# Patient Record
Sex: FEMALE | Race: WHITE | NOT HISPANIC OR LATINO | Employment: FULL TIME | URBAN - METROPOLITAN AREA
[De-identification: names, ages, dates, MRNs, and addresses within clinical notes are randomized per-mention and may not be internally consistent; named-entity substitution may affect disease eponyms.]

---

## 2017-03-13 ENCOUNTER — OFFICE VISIT - HEALTHEAST (OUTPATIENT)
Dept: FAMILY MEDICINE | Facility: CLINIC | Age: 47
End: 2017-03-13

## 2017-03-13 DIAGNOSIS — D48.5 NEOPLASM OF UNCERTAIN BEHAVIOR OF SKIN: ICD-10-CM

## 2017-03-13 DIAGNOSIS — E66.3 OVERWEIGHT: ICD-10-CM

## 2017-03-13 DIAGNOSIS — Z00.00 ROUTINE GENERAL MEDICAL EXAMINATION AT A HEALTH CARE FACILITY: ICD-10-CM

## 2017-03-13 LAB
CHOLEST SERPL-MCNC: 227 MG/DL
FASTING STATUS PATIENT QL REPORTED: YES
HDLC SERPL-MCNC: 79 MG/DL
LDLC SERPL CALC-MCNC: 136 MG/DL
TRIGL SERPL-MCNC: 58 MG/DL

## 2017-03-13 ASSESSMENT — MIFFLIN-ST. JEOR: SCORE: 1384.3

## 2017-03-15 ENCOUNTER — HOSPITAL ENCOUNTER (OUTPATIENT)
Dept: MAMMOGRAPHY | Facility: HOSPITAL | Age: 47
Discharge: HOME | End: 2017-03-15
Attending: NURSE PRACTITIONER

## 2017-03-15 DIAGNOSIS — Z00.00 ROUTINE GENERAL MEDICAL EXAMINATION AT A HEALTH CARE FACILITY: ICD-10-CM

## 2017-03-24 ENCOUNTER — RECORDS - HEALTHEAST (OUTPATIENT)
Dept: ADMINISTRATIVE | Facility: OTHER | Age: 47
End: 2017-03-24

## 2017-05-22 ENCOUNTER — COMMUNICATION - HEALTHEAST (OUTPATIENT)
Dept: FAMILY MEDICINE | Facility: CLINIC | Age: 47
End: 2017-05-22

## 2017-05-22 DIAGNOSIS — M79.10 MUSCLE PAIN: ICD-10-CM

## 2017-05-22 RX ORDER — IBUPROFEN 800 MG/1
TABLET, FILM COATED ORAL
Qty: 30 TABLET | Refills: 5 | Status: SHIPPED | OUTPATIENT
Start: 2017-05-22 | End: 2022-10-05

## 2017-07-01 ENCOUNTER — COMMUNICATION - HEALTHEAST (OUTPATIENT)
Dept: FAMILY MEDICINE | Facility: CLINIC | Age: 47
End: 2017-07-01

## 2017-10-30 ENCOUNTER — OFFICE VISIT - HEALTHEAST (OUTPATIENT)
Dept: FAMILY MEDICINE | Facility: CLINIC | Age: 47
End: 2017-10-30

## 2017-10-30 ENCOUNTER — COMMUNICATION - HEALTHEAST (OUTPATIENT)
Dept: FAMILY MEDICINE | Facility: CLINIC | Age: 47
End: 2017-10-30

## 2017-10-30 DIAGNOSIS — J02.9 ACUTE PHARYNGITIS: ICD-10-CM

## 2017-10-31 ENCOUNTER — COMMUNICATION - HEALTHEAST (OUTPATIENT)
Dept: FAMILY MEDICINE | Facility: CLINIC | Age: 47
End: 2017-10-31

## 2017-10-31 ENCOUNTER — AMBULATORY - HEALTHEAST (OUTPATIENT)
Dept: FAMILY MEDICINE | Facility: CLINIC | Age: 47
End: 2017-10-31

## 2017-11-01 ENCOUNTER — OFFICE VISIT - HEALTHEAST (OUTPATIENT)
Dept: FAMILY MEDICINE | Facility: CLINIC | Age: 47
End: 2017-11-01

## 2017-11-01 DIAGNOSIS — J06.9 URI (UPPER RESPIRATORY INFECTION): ICD-10-CM

## 2017-11-01 ASSESSMENT — MIFFLIN-ST. JEOR
SCORE: 1338.26
SCORE: 1338.26

## 2018-01-10 ENCOUNTER — COMMUNICATION - HEALTHEAST (OUTPATIENT)
Dept: FAMILY MEDICINE | Facility: CLINIC | Age: 48
End: 2018-01-10

## 2018-07-11 ENCOUNTER — COMMUNICATION - HEALTHEAST (OUTPATIENT)
Dept: FAMILY MEDICINE | Facility: CLINIC | Age: 48
End: 2018-07-11

## 2019-01-20 ENCOUNTER — COMMUNICATION - HEALTHEAST (OUTPATIENT)
Dept: FAMILY MEDICINE | Facility: CLINIC | Age: 49
End: 2019-01-20

## 2019-01-21 ENCOUNTER — COMMUNICATION - HEALTHEAST (OUTPATIENT)
Dept: FAMILY MEDICINE | Facility: CLINIC | Age: 49
End: 2019-01-21

## 2019-03-23 ENCOUNTER — COMMUNICATION - HEALTHEAST (OUTPATIENT)
Dept: FAMILY MEDICINE | Facility: CLINIC | Age: 49
End: 2019-03-23

## 2019-03-23 DIAGNOSIS — M62.838 MUSCLE SPASM: ICD-10-CM

## 2019-04-25 ENCOUNTER — COMMUNICATION - HEALTHEAST (OUTPATIENT)
Dept: FAMILY MEDICINE | Facility: CLINIC | Age: 49
End: 2019-04-25

## 2019-04-25 DIAGNOSIS — M62.838 MUSCLE SPASM: ICD-10-CM

## 2019-05-26 ENCOUNTER — COMMUNICATION - HEALTHEAST (OUTPATIENT)
Dept: FAMILY MEDICINE | Facility: CLINIC | Age: 49
End: 2019-05-26

## 2019-05-26 DIAGNOSIS — M62.838 MUSCLE SPASM: ICD-10-CM

## 2019-06-22 ENCOUNTER — COMMUNICATION - HEALTHEAST (OUTPATIENT)
Dept: FAMILY MEDICINE | Facility: CLINIC | Age: 49
End: 2019-06-22

## 2019-06-22 DIAGNOSIS — M62.838 MUSCLE SPASM: ICD-10-CM

## 2019-07-26 ENCOUNTER — COMMUNICATION - HEALTHEAST (OUTPATIENT)
Dept: FAMILY MEDICINE | Facility: CLINIC | Age: 49
End: 2019-07-26

## 2019-07-26 DIAGNOSIS — M62.838 MUSCLE SPASM: ICD-10-CM

## 2019-08-14 ENCOUNTER — OFFICE VISIT - HEALTHEAST (OUTPATIENT)
Dept: FAMILY MEDICINE | Facility: CLINIC | Age: 49
End: 2019-08-14

## 2019-08-14 ENCOUNTER — AMBULATORY - HEALTHEAST (OUTPATIENT)
Dept: OTHER | Facility: CLINIC | Age: 49
End: 2019-08-14

## 2019-08-14 ENCOUNTER — DOCUMENTATION ONLY (OUTPATIENT)
Dept: OTHER | Facility: CLINIC | Age: 49
End: 2019-08-14

## 2019-08-14 DIAGNOSIS — Z00.00 PHYSICAL EXAM: ICD-10-CM

## 2019-08-14 DIAGNOSIS — Z11.4 SCREENING FOR HIV (HUMAN IMMUNODEFICIENCY VIRUS): ICD-10-CM

## 2019-08-14 DIAGNOSIS — M62.838 MUSCLE SPASM: ICD-10-CM

## 2019-08-14 DIAGNOSIS — E66.3 OVERWEIGHT (BMI 25.0-29.9): ICD-10-CM

## 2019-08-14 DIAGNOSIS — R03.0 ELEVATED BLOOD-PRESSURE READING WITHOUT DIAGNOSIS OF HYPERTENSION: ICD-10-CM

## 2019-08-14 DIAGNOSIS — Z12.31 VISIT FOR SCREENING MAMMOGRAM: ICD-10-CM

## 2019-08-14 DIAGNOSIS — I83.92 ASYMPTOMATIC VARICOSE VEINS OF LEFT LOWER EXTREMITY: ICD-10-CM

## 2019-08-14 LAB
CHOLEST SERPL-MCNC: 225 MG/DL
FASTING STATUS PATIENT QL REPORTED: NO
FASTING STATUS PATIENT QL REPORTED: NO
GLUCOSE BLD-MCNC: 84 MG/DL (ref 70–125)
HDLC SERPL-MCNC: 75 MG/DL
HGB BLD-MCNC: 13.8 G/DL (ref 12–16)
HIV 1+2 AB+HIV1 P24 AG SERPL QL IA: NEGATIVE
LDLC SERPL CALC-MCNC: 119 MG/DL
TRIGL SERPL-MCNC: 156 MG/DL
TSH SERPL DL<=0.005 MIU/L-ACNC: 0.95 UIU/ML (ref 0.3–5)

## 2019-08-14 ASSESSMENT — MIFFLIN-ST. JEOR: SCORE: 1394.62

## 2019-08-27 ENCOUNTER — AMBULATORY - HEALTHEAST (OUTPATIENT)
Dept: NURSING | Facility: CLINIC | Age: 49
End: 2019-08-27

## 2019-08-27 DIAGNOSIS — R03.0 ELEVATED BLOOD PRESSURE READING WITHOUT DIAGNOSIS OF HYPERTENSION: ICD-10-CM

## 2019-08-27 DIAGNOSIS — I10 ESSENTIAL HYPERTENSION: ICD-10-CM

## 2019-09-24 ENCOUNTER — HOSPITAL ENCOUNTER (OUTPATIENT)
Dept: MAMMOGRAPHY | Facility: CLINIC | Age: 49
Discharge: HOME | End: 2019-09-24
Attending: FAMILY MEDICINE

## 2019-09-24 DIAGNOSIS — Z12.31 VISIT FOR SCREENING MAMMOGRAM: ICD-10-CM

## 2019-10-11 ENCOUNTER — COMMUNICATION - HEALTHEAST (OUTPATIENT)
Dept: FAMILY MEDICINE | Facility: CLINIC | Age: 49
End: 2019-10-11

## 2019-10-11 DIAGNOSIS — I10 BENIGN ESSENTIAL HYPERTENSION: ICD-10-CM

## 2020-06-11 ENCOUNTER — COMMUNICATION - HEALTHEAST (OUTPATIENT)
Dept: HEALTH INFORMATION MANAGEMENT | Facility: CLINIC | Age: 50
End: 2020-06-11

## 2020-08-02 ENCOUNTER — COMMUNICATION - HEALTHEAST (OUTPATIENT)
Dept: FAMILY MEDICINE | Facility: CLINIC | Age: 50
End: 2020-08-02

## 2020-08-02 DIAGNOSIS — M62.838 MUSCLE SPASM: ICD-10-CM

## 2020-09-28 ENCOUNTER — COMMUNICATION - HEALTHEAST (OUTPATIENT)
Dept: FAMILY MEDICINE | Facility: CLINIC | Age: 50
End: 2020-09-28

## 2020-09-28 DIAGNOSIS — I10 BENIGN ESSENTIAL HYPERTENSION: ICD-10-CM

## 2020-10-01 ENCOUNTER — COMMUNICATION - HEALTHEAST (OUTPATIENT)
Dept: FAMILY MEDICINE | Facility: CLINIC | Age: 50
End: 2020-10-01

## 2020-10-27 ENCOUNTER — COMMUNICATION - HEALTHEAST (OUTPATIENT)
Dept: FAMILY MEDICINE | Facility: CLINIC | Age: 50
End: 2020-10-27

## 2020-10-27 DIAGNOSIS — M62.838 MUSCLE SPASM: ICD-10-CM

## 2020-10-27 DIAGNOSIS — I10 BENIGN ESSENTIAL HYPERTENSION: ICD-10-CM

## 2020-12-28 ENCOUNTER — OFFICE VISIT - HEALTHEAST (OUTPATIENT)
Dept: FAMILY MEDICINE | Facility: CLINIC | Age: 50
End: 2020-12-28

## 2020-12-28 DIAGNOSIS — Z12.31 VISIT FOR SCREENING MAMMOGRAM: ICD-10-CM

## 2020-12-28 DIAGNOSIS — M62.838 MUSCLE SPASM: ICD-10-CM

## 2020-12-28 DIAGNOSIS — I10 BENIGN ESSENTIAL HYPERTENSION: ICD-10-CM

## 2020-12-28 LAB
ANION GAP SERPL CALCULATED.3IONS-SCNC: 12 MMOL/L (ref 5–18)
BUN SERPL-MCNC: 15 MG/DL (ref 8–22)
CALCIUM SERPL-MCNC: 9.4 MG/DL (ref 8.5–10.5)
CHLORIDE BLD-SCNC: 107 MMOL/L (ref 98–107)
CO2 SERPL-SCNC: 21 MMOL/L (ref 22–31)
CREAT SERPL-MCNC: 0.75 MG/DL (ref 0.6–1.1)
GFR SERPL CREATININE-BSD FRML MDRD: >60 ML/MIN/1.73M2
GLUCOSE BLD-MCNC: 85 MG/DL (ref 70–125)
POTASSIUM BLD-SCNC: 4.7 MMOL/L (ref 3.5–5)
SODIUM SERPL-SCNC: 140 MMOL/L (ref 136–145)

## 2020-12-28 RX ORDER — LOSARTAN POTASSIUM 25 MG/1
25 TABLET ORAL DAILY
Qty: 90 TABLET | Refills: 3 | Status: SHIPPED | OUTPATIENT
Start: 2020-12-28 | End: 2022-03-22

## 2020-12-28 RX ORDER — CYCLOBENZAPRINE HCL 10 MG
TABLET ORAL
Qty: 90 TABLET | Refills: 3 | Status: SHIPPED | OUTPATIENT
Start: 2020-12-28 | End: 2022-01-26

## 2020-12-28 ASSESSMENT — MIFFLIN-ST. JEOR: SCORE: 1398.59

## 2021-03-31 ENCOUNTER — COMMUNICATION - HEALTHEAST (OUTPATIENT)
Dept: FAMILY MEDICINE | Facility: CLINIC | Age: 51
End: 2021-03-31

## 2021-05-26 NOTE — TELEPHONE ENCOUNTER
RN cannot approve Refill Request    RN can NOT refill this medication med is not covered by policy/route to provider.     Last office visit: Visit date not found Last Physical: 4/23/2015 Last MTM visit: Visit date not found Last visit same specialty: 11/1/2017 Stephy Kurtz DO.  Next visit within 3 mo: Visit date not found  Next physical within 3 mo: Visit date not found      Nitin Keys, [facility] Triage/Med Refill 3/23/2019    Requested Prescriptions   Pending Prescriptions Disp Refills     cyclobenzaprine (FLEXERIL) 10 MG tablet [Pharmacy Med Name: CYCLOBENZAPRINE HCL 10MG TABS] 30 tablet 0     Sig: TAKE ONE TABLET BY MOUTH THREE TIMES A DAY AS NEEDED FOR MUSCLE SPASMS    There is no refill protocol information for this order

## 2021-05-28 ENCOUNTER — RECORDS - HEALTHEAST (OUTPATIENT)
Dept: ADMINISTRATIVE | Facility: CLINIC | Age: 51
End: 2021-05-28

## 2021-05-28 NOTE — TELEPHONE ENCOUNTER
RN cannot approve Refill Request    RN can NOT refill this medication med is not covered by policy/route to provider. Last office visit: Visit date not found Last Physical: 4/23/2015 Last MTM visit: Visit date not found Last visit same specialty: 11/1/2017 Stephy Kurtz DO.  Next visit within 3 mo: Visit date not found  Next physical within 3 mo: Visit date not found      Teressa Patel, [facility] Triage/Med Refill 4/25/2019    Requested Prescriptions   Pending Prescriptions Disp Refills     cyclobenzaprine (FLEXERIL) 10 MG tablet [Pharmacy Med Name: CYCLOBENZAPRINE HCL 10MG TABS] 30 tablet 0     Sig: TAKE ONE TABLET BY MOUTH THREE TIMES A DAY AS NEEDED FOR MUSCLE SPASMS       There is no refill protocol information for this order

## 2021-05-29 NOTE — TELEPHONE ENCOUNTER
RN cannot approve Refill Request    RN can NOT refill this medication med is not covered by policy/route to provider. Last office visit: Visit date not found Last Physical: Visit date not found Last MTM visit: Visit date not found Last visit same specialty: 11/1/2017 Stephy Kurtz DO.  Next visit within 3 mo: Visit date not found  Next physical within 3 mo: Visit date not found      Ary Rousseau, [facility] Triage/Med Refill 6/22/2019    Requested Prescriptions   Pending Prescriptions Disp Refills     cyclobenzaprine (FLEXERIL) 10 MG tablet [Pharmacy Med Name: CYCLOBENZAPRINE HCL 10MG TABS] 30 tablet 0     Sig: TAKE ONE TABLET BY MOUTH THREE TIMES A DAY AS NEEDED FOR MUSCLE SPASMS       There is no refill protocol information for this order

## 2021-05-29 NOTE — TELEPHONE ENCOUNTER
RN cannot approve Refill Request    RN can NOT refill this medication med is not covered by policy/route to provider. Last office visit: Visit date not found Last Physical: 4/23/2015 Last MTM visit: Visit date not found Last visit same specialty: 11/1/2017 Stephy Kurtz DO.  Next visit within 3 mo: Visit date not found  Next physical within 3 mo: Visit date not found      Guillermina Marcial, [facility] Triage/Med Refill 5/26/2019    Requested Prescriptions   Pending Prescriptions Disp Refills     cyclobenzaprine (FLEXERIL) 10 MG tablet [Pharmacy Med Name: CYCLOBENZAPRINE HCL 10MG TABS] 30 tablet 0     Sig: TAKE ONE TABLET BY MOUTH THREE TIMES A DAY AS NEEDED FOR MUSCLE SPASMS       There is no refill protocol information for this order

## 2021-05-30 ENCOUNTER — HEALTH MAINTENANCE LETTER (OUTPATIENT)
Age: 51
End: 2021-05-30

## 2021-05-30 VITALS — WEIGHT: 166.1 LBS | HEIGHT: 65 IN | BODY MASS INDEX: 27.67 KG/M2

## 2021-05-30 NOTE — TELEPHONE ENCOUNTER
Refill sent.  This is patient's last refill as she has not been seen since 2017.  Please schedule her for physical and med check.

## 2021-05-30 NOTE — TELEPHONE ENCOUNTER
Left message to call back for: pt  Information to relay to patient:  Left message to call and schedule physical and med ck with dr figueroa.

## 2021-05-30 NOTE — TELEPHONE ENCOUNTER
RN cannot approve Refill Request    RN can NOT refill this medication med is not covered by policy/route to provider. Last office visit: Visit date not found Last Physical: Visit date not found Last MTM visit: Visit date not found Last visit same specialty: 11/1/2017 Stephy Kurtz DO.  Next visit within 3 mo: Visit date not found  Next physical within 3 mo: Visit date not found      Jo Jacques, [facility] Triage/Med Refill 7/26/2019    Requested Prescriptions   Pending Prescriptions Disp Refills     cyclobenzaprine (FLEXERIL) 10 MG tablet [Pharmacy Med Name: CYCLOBENZAPRINE HCL 10MG TABS] 30 tablet 0     Sig: TAKE ONE TABLET BY MOUTH THREE TIMES A DAY AS NEEDED FOR MUSCLE SPASMS       There is no refill protocol information for this order

## 2021-05-31 VITALS — WEIGHT: 158.31 LBS | BODY MASS INDEX: 26.34 KG/M2

## 2021-05-31 VITALS — BODY MASS INDEX: 24.78 KG/M2 | WEIGHT: 154.2 LBS | HEIGHT: 66 IN

## 2021-05-31 NOTE — PROGRESS NOTES
Pt here for blood pressure check with nurse. Elevated to 140/100 x2 - per review has been elevated in the past (8/14/19 and 11/1/17). Would recommend starting antihypertensive medication at this time with lisinopril 5mg daily and f/u in 2-4 weeks for recheck blood pressure with BMP.    Dr Stuart

## 2021-05-31 NOTE — PROGRESS NOTES
Assessment:        1. Physical exam  Hemoglobin    Glucose    Lipid Cascade   2. Visit for screening mammogram  Mammo Screening Bilateral   3. Screening for HIV (human immunodeficiency virus)  HIV Antigen/Antibody Screening Cascade   4. Muscle spasm  cyclobenzaprine (FLEXERIL) 10 MG tablet   5. Overweight (BMI 25.0-29.9)  Thyroid Stimulating Hormone (TSH)   6. Asymptomatic varicose veins of left lower extremity  Ambulatory referral to Vascular Surgery   7. Elevated blood-pressure reading without diagnosis of hypertension         Plan:      1. Healthcare maintenance: Pap smears are done with her gynecologist.  Last Pap was in 2015.  She is due for mammogram and orders placed.  Discussed she will be due for tetanus booster next year.  Check lipids, glucose and hemoglobin.  HIV screen also ordered.  Encouraged regular exercise and healthy diet.  Advised no more than 1 alcoholic beverage per day.  Encourage weight loss efforts.  Follow-up in 1 year for yearly physical  2. Muscle spasms: She will continue cyclobenzaprine at night as needed.  Refill provided  3. Overweight: Encouraged regular exercise, healthy diet and weight loss.  Advised reduction of alcohol intake.  Check TSH today.  Check lipids and glucose  4. Varicose vein of left lower extremity: Referral placed to vascular center  5. Elevated blood pressure reading: Recommend lifestyle changes including reduction of alcohol and salt in diet as well as weight loss.  Follow-up for nurse visit in 2 weeks for blood pressure check.          Subjective:      Haley Fonseca is a 49 y.o. female who presents for an annual exam.  We reviewed her health history.  She has not been seen by myself in several years.  She remains in good health overall.  She has trouble with muscle spasms and twitching that interferes with her sleep.  She typically takes cyclobenzaprine and ibuprofen at night and this combination of medication works well to allow her muscles to relax and  help her to fall asleep and get a good night sleep.  She does not experience any drowsiness in the the morning.  We reviewed and updated family history, medications, allergies and social history.  Unfortunately, she reports that her  passed away about a year and a half ago related to complications of alcoholism.  She drinks about 14 alcoholic beverages per day.  She reports that she exercises regularly.  She admits that she does not always eat the healthiest diet.  She is surprised to see that she has gained over 10 pounds since her last office visit.  She does not have headaches but about once a month she will notice a little bit of dizziness if she looks off to the side in certain directions.  She admits that she has been under increased stress since her  passed away.  She becomes a little tearful in discussing this but states that overall she feels that she is coping well.  She has a prominent varicose vein over her left kneecap.  This does not cause her discomfort but she is concerned that she will bump her leg into something and the vein will start to bleed or she will develop other problems.  She is interested in seeing a vein specialist to talk about a procedure to treat this even though she is aware it would likely be cosmetic and may not be covered by insurance.  Review of systems is otherwise negative.  She has no other concerns or questions today.  Healthcare maintenance: She gets Pap smears done through her gynecologist.    Healthy Habits:   Regular Exercise: Yes  Sunscreen Use: Yes  Healthy Diet: Yes  Dental Visits Regularly: Yes  Seat Belt: Yes  Sexually active: No  Self Breast Exam Monthly:Yes  Hemoccults: N/A  Flex Sig: N/A  Colonoscopy: N/A  Lipid Profile: Yes  Glucose Screen: Yes  Prevention of Osteoporosis: Yes  Last Dexa: N/A        Immunization History   Administered Date(s) Administered     Influenza, Live, Nasal LAIV3 09/25/2012     Influenza, inj, historic,unspecified  10/27/2009, 2011, 10/11/2017     Influenza, nasal, historic 2012     Influenza, seasonal,quad inj 36+ mos 10/21/2015, 2016     Influenza, seasonal,quad inj 6-35 mos 2010, 10/04/2014     Influenza,live, Nasal Laiv4 2013     Novel Influenza [id number], Nasal 11/10/2009     Tdap 2010     Immunization status: up to date and documented.      Gynecologic History  Patient's last menstrual period was 2019 (lmp unknown).  Contraception: none  Last Pap: . Results were: normal  Last mammogram: . Results were: normal      OB History    Para Term  AB Living   2 2 2         SAB TAB Ectopic Multiple Live Births                  # Outcome Date GA Lbr Justino/2nd Weight Sex Delivery Anes PTL Lv   2 Term            1 Term                Current Outpatient Medications   Medication Sig Dispense Refill     cyclobenzaprine (FLEXERIL) 10 MG tablet TAKE ONE TABLET BY MOUTH at night as needed for muscle spasms 90 tablet 3     ibuprofen (ADVIL,MOTRIN) 800 MG tablet TAKE ONE TABLET BY MOUTH AT BEDTIME 30 tablet 5     MULTIVITAMIN ORAL Take by mouth daily.       No current facility-administered medications for this visit.      No past medical history on file.  No past surgical history on file.  Patient has no known allergies.  Family History   Problem Relation Age of Onset     Hypertension Mother      Hypertension Father      Heart disease Father      Hypertension Maternal Grandmother      Hypertension Paternal Grandmother      Social History     Socioeconomic History     Marital status:      Spouse name: Not on file     Number of children: Not on file     Years of education: Not on file     Highest education level: Not on file   Occupational History     Not on file   Social Needs     Financial resource strain: Not on file     Food insecurity:     Worry: Not on file     Inability: Not on file     Transportation needs:     Medical: Not on file     Non-medical: Not on file   Tobacco  "Use     Smoking status: Former Smoker     Smokeless tobacco: Never Used   Substance and Sexual Activity     Alcohol use: Yes     Alcohol/week: 7.7 oz     Types: 7 Cans of beer, 7 Standard drinks or equivalent per week     Comment: 14 drink total per week     Drug use: No     Sexual activity: Yes     Partners: Male     Comment:    Lifestyle     Physical activity:     Days per week: Not on file     Minutes per session: Not on file     Stress: Not on file   Relationships     Social connections:     Talks on phone: Not on file     Gets together: Not on file     Attends Restorationist service: Not on file     Active member of club or organization: Not on file     Attends meetings of clubs or organizations: Not on file     Relationship status: Not on file     Intimate partner violence:     Fear of current or ex partner: Not on file     Emotionally abused: Not on file     Physically abused: Not on file     Forced sexual activity: Not on file   Other Topics Concern     Not on file   Social History Narrative     Not on file       Review of Systems      See HPI    Objective:         BP (!) 148/98 (Patient Site: Left Arm, Patient Position: Sitting, Cuff Size: Adult Large)   Pulse 93   Temp 99.1  F (37.3  C) (Oral)   Ht 5' 4.5\" (1.638 m)   Wt 170 lb 2 oz (77.2 kg)   LMP 07/22/2019 (LMP Unknown)   SpO2 96%   Breastfeeding? No   BMI 28.75 kg/m        Physical Exam:  General Appearance: Alert, cooperative, no distress, appears stated age  Head: Normocephalic, without obvious abnormality, atraumatic  Eyes: PERRL, conjunctiva/corneas clear, EOM's intact  Ears: Normal TM's and external ear canals, both ears  Nose: Nares normal, septum midline,mucosa normal, no drainage  Throat: Lips, mucosa, and tongue normal; teeth and gums normal  Neck: Supple, symmetrical, trachea midline, no adenopathy;  thyroid: not enlarged, symmetric, no tenderness/mass/nodules  Back: Symmetric, no curvature, ROM normal  Lungs: Clear to auscultation " bilaterally, respirations unlabored  Breasts: No breast masses, tenderness, asymmetry, or nipple discharge.  Heart: Regular rate and rhythm, S1 and S2 normal, no murmur, rub, or gallop, Abdomen: Soft, non-tender, bowel sounds active all four quadrants,  no masses, no organomegaly  Pelvic:Not examined  Extremities: Extremities normal, atraumatic, no cyanosis or edema, varicose vein over left patella  Skin: Skin color, texture, turgor normal, no rashes or lesions  Lymph nodes: Cervical, supraclavicular, and axillary nodes normal  Neurologic: Normal

## 2021-06-03 VITALS — WEIGHT: 170.13 LBS | HEIGHT: 65 IN | BODY MASS INDEX: 28.35 KG/M2

## 2021-06-05 VITALS
HEART RATE: 82 BPM | WEIGHT: 168.7 LBS | OXYGEN SATURATION: 96 % | SYSTOLIC BLOOD PRESSURE: 122 MMHG | BODY MASS INDEX: 28.11 KG/M2 | HEIGHT: 65 IN | DIASTOLIC BLOOD PRESSURE: 86 MMHG

## 2021-06-09 NOTE — PROGRESS NOTES
Subjective:     Haley is a 46 y.o. female presenting to the clinic for a female physical.     LMP: one week ago, regular   Hx of abnormal pap smear: none   Last pap smear: unsure   Perform self-breast exams: occasional  Vaginal discharge or irritation: none   Sexually active: yes,    Contraception: IUD   Concerns for STDs: none   Previous pregnancies:three pregnancy (2 live births, one miscarriage)     Patient has a skin lesion present on her left hand.  It is dime sized and has increased in size over the past year.  She denies any irritation or bleeding.  Her father has a history of skin cancer.    Patient had a positive depression screening.  She states that her  is seeking treatment right now for alcohol abuse.  Patient denies concerns for suicide or depression.  She does not want to receive treatment.  Admits she is under stress.    Review of systems:  I performed a 10 point review of systems.  All pertinent positives and negatives are noted in the HPI. All others are negative.     No Known Allergies    Current Outpatient Prescriptions on File Prior to Visit   Medication Sig Dispense Refill     cyclobenzaprine (FLEXERIL) 10 MG tablet TAKE ONE TABLET BY MOUTH THREE TIMES DAILY AS NEEDED FOR MUSCLE SPASM 30 tablet 5     ibuprofen (ADVIL,MOTRIN) 800 MG tablet TAKE ONE TABLET BY MOUTH AT BEDTIME 30 tablet 5     MULTIVITAMIN ORAL Take by mouth daily.       [DISCONTINUED] albuterol (PROVENTIL HFA;VENTOLIN HFA) 90 mcg/actuation inhaler Inhale 2 puffs every 4 (four) hours as needed for shortness of breath (chest tightness or cough). 1 Inhaler 3     [DISCONTINUED] azithromycin (ZITHROMAX) 250 MG tablet Take 2 tabs on day 1 and then 1 tab daily for next 4 days 6 tablet 0     [DISCONTINUED] fluticasone (FLONASE) 50 mcg/actuation nasal spray 2 sprays into each nostril daily.       No current facility-administered medications on file prior to visit.        Social History     Social History     Marital  status:      Spouse name: N/A     Number of children: N/A     Years of education: N/A     Occupational History     Not on file.     Social History Main Topics     Smoking status: Former Smoker     Smokeless tobacco: Not on file     Alcohol use 7.7 oz/week     7 Cans of beer, 7 Standard drinks or equivalent per week      Comment: 14 drink total per week     Drug use: No     Sexual activity: Yes     Partners: Male      Comment:      Other Topics Concern     Not on file     Social History Narrative       History reviewed. No pertinent past medical history.    Family History   Problem Relation Age of Onset     Hypertension Mother      Hypertension Father      Hypertension Maternal Grandmother      Hypertension Paternal Grandmother        History reviewed. No pertinent surgical history.    Objective:     Vitals:    03/13/17 0830   BP: 128/72   Pulse:    SpO2:        Patient is alert, no obvious distress.   Skin: Warm, dry.  She has a dime size flat brown skin lesion present on her left hand.  It appears dry.  HEENT:  Eyes normal.  Ears normal.  Nose patent, mucosa pink.  Oropharynx mucosa pink, no lesions or tonsil enlargement.   Neck:  Supple, without lymphadenopathy, bruits, JVD. Thyroid normal texture and size.    Lungs:  Clear to auscultation.  No wheezing, rales noted.  Respirations even and unlabored.   Heart:  Regular rate and rhythm.  No murmurs.   Breasts:  Normal.  No surrounding adenopathy.   Abdomen: Soft, nontender.  No organomegaly.  Bowel sounds normoactive.  No guarding or masses noted.   :  Deferred per patient   Musculoskeletal:  Full ROM of extremities.  Muscle strength equal +5/5.   Neurological:  Cranial nerves 2-12 intact.      Assessment and Plan:     1. Routine general medical examination at a health care facility  Mammo Screening Bilateral    Glucose    Lipid Cascade    Hemoglobin   2. Overweight     3. Neoplasm of uncertain behavior of skin  Ambulatory referral to Dermatology      Discussed consuming a healthy diet and exercising.  Discussed importance of routine sunscreen.  Discussed adequate calcium and vitamin D intake.  She is due for mammogram.  We will [facility] to [facility]  Will refer to dermatology for skin lesion.  She is content with the plan.  The following high BMI interventions were performed this visit: exercise promotion: strength training, exercise promotion: stretching and nutrition therapy

## 2021-06-10 NOTE — TELEPHONE ENCOUNTER
RN cannot approve Refill Request    RN can NOT refill this medication med is not covered by policy/route to provider. Last office visit: Visit date not found Last Physical: 8/14/2019 Last MTM visit: Visit date not found Last visit same specialty: 11/1/2017 Stephy Kurtz DO.  Next visit within 3 mo: Visit date not found  Next physical within 3 mo: Visit date not found      Ary Rousseau, [facility] Triage/Med Refill 8/2/2020    Requested Prescriptions   Pending Prescriptions Disp Refills     cyclobenzaprine (FLEXERIL) 10 MG tablet [Pharmacy Med Name: CYCLOBENZAPRINE HCL 10MG TABS] 90 tablet 3     Sig: TAKE ONE TABLET BY MOUTH AT BEDTIME AS NEEDED FOR MUSCLE SPASMS       There is no refill protocol information for this order

## 2021-06-11 NOTE — TELEPHONE ENCOUNTER
RN cannot approve Refill Request    RN can NOT refill this medication PCP messaged that patient is overdue for Office Visit and Protocol failed and NO refill given. Last office visit: Visit date not found Last Physical: 8/14/2019 Last MTM visit: Visit date not found Last visit same specialty: 11/1/2017 Stephy Kurtz DO.  Next visit within 3 mo: Visit date not found  Next physical within 3 mo: Visit date not found      Lachelle Salas, [facility] Triage/Med Refill 9/30/2020    Requested Prescriptions   Pending Prescriptions Disp Refills     losartan (COZAAR) 25 MG tablet [Pharmacy Med Name: LOSARTAN POTASSIUM 25MG TABS] 90 tablet 3     Sig: TAKE ONE TABLET BY MOUTH EVERY DAY       Angiotensin Receptor Blocker Protocol Failed - 9/28/2020  4:06 AM        Failed - PCP or prescribing provider visit in past 12 months       Last office visit with prescriber/PCP: Visit date not found OR same dept: Visit date not found OR same specialty: 11/1/2017 Stephy Kurtz DO  Last physical: 8/14/2019 Last MTM visit: Visit date not found   Next visit within 3 mo: Visit date not found  Next physical within 3 mo: Visit date not found  Prescriber OR PCP: Torie Tam MD  Last diagnosis associated with med order: 1. Benign essential hypertension  - losartan (COZAAR) 25 MG tablet [Pharmacy Med Name: LOSARTAN POTASSIUM 25MG TABS]; TAKE ONE TABLET BY MOUTH EVERY DAY  Dispense: 90 tablet; Refill: 3    If protocol passes may refill for 12 months if within 3 months of last provider visit (or a total of 15 months).             Failed - Serum potassium within the past 12 months     No results found for: LN-POTASSIUM          Failed - Blood pressure filed in past 12 months     BP Readings from Last 1 Encounters:   08/27/19 (!) 140/100             Failed - Serum creatinine within the past 12 months     Creatinine   Date Value Ref Range Status   04/23/2015 0.71 0.60 - 1.10 mg/dL Final

## 2021-06-11 NOTE — TELEPHONE ENCOUNTER
Refill sent today. Pt due for office visit before any further refills can be given. Please call to schedule

## 2021-06-12 NOTE — TELEPHONE ENCOUNTER
RN cannot approve Refill Request    RN can NOT refill this medication med is not covered by policy/route to provider and Protocol failed and NO refill given. Last office visit: Visit date not found Last Physical: 8/14/2019 Last MTM visit: Visit date not found Last visit same specialty: 11/1/2017 Stephy Kurtz DO.  Next visit within 3 mo: Visit date not found  Next physical within 3 mo: Visit date not found      Nevin Carmona, [facility] Connection Triage/Med Refill 10/28/2020    Requested Prescriptions   Pending Prescriptions Disp Refills     cyclobenzaprine (FLEXERIL) 10 MG tablet 90 tablet 0     Sig: TAKE ONE TABLET BY MOUTH AT BEDTIME AS NEEDED FOR MUSCLE SPASMS       There is no refill protocol information for this order        losartan (COZAAR) 25 MG tablet 90 tablet 0     Sig: Take 1 tablet (25 mg total) by mouth daily.       Angiotensin Receptor Blocker Protocol Failed - 10/27/2020  4:03 PM        Failed - PCP or prescribing provider visit in past 12 months       Last office visit with prescriber/PCP: Visit date not found OR same dept: Visit date not found OR same specialty: 11/1/2017 Stephy Kurtz DO  Last physical: 8/14/2019 Last MTM visit: Visit date not found   Next visit within 3 mo: Visit date not found  Next physical within 3 mo: Visit date not found  Prescriber OR PCP: Torie Tam MD  Last diagnosis associated with med order: 1. Muscle spasm  - cyclobenzaprine (FLEXERIL) 10 MG tablet; TAKE ONE TABLET BY MOUTH AT BEDTIME AS NEEDED FOR MUSCLE SPASMS  Dispense: 90 tablet; Refill: 0    2. Benign essential hypertension  - losartan (COZAAR) 25 MG tablet; Take 1 tablet (25 mg total) by mouth daily.  Dispense: 90 tablet; Refill: 0    If protocol passes may refill for 12 months if within 3 months of last provider visit (or a total of 15 months).             Failed - Serum potassium within the past 12 months     No results found for: LN-POTASSIUM          Failed - Blood pressure filed in past 12 months      BP Readings from Last 1 Encounters:   08/27/19 (!) 140/100             Failed - Serum creatinine within the past 12 months     Creatinine   Date Value Ref Range Status   04/23/2015 0.71 0.60 - 1.10 mg/dL Final

## 2021-06-13 NOTE — PROGRESS NOTES
Assessment/Plan:        Diagnoses and all orders for this visit:    Acute pharyngitis  -     Rapid Strep A Screen-Throat  -     Group A Strep, RNA Direct Detection, Throat    Other orders  -     alum/mag hydrox-simethicone-diphenhydramine-lidocaine (MAGIC MOUTHWASH) suspension; Swish and spit 15 mL 4 (four) times a day. Swish and spit as directed  Dispense: 120 mL; Refill: 0        Rapid strep was done.  Results came back negative.  Will send that for culture.  Will provide her with Magic mouthwash.  Precautionary measures, fluid hydration.  Recheck with PCP if worse.  She was agreeable with the plans.  Subjective:    Patient ID: Haley Fonseca is a 47 y.o. female.    HPI    Haley is here with concerns about mouth sores.  She developed a cough 5 days ago.  She tries to mobilize his secretions but unsure of color.  She swallows them.  No hemoptysis.  Felt feverish a few mornings but was not able to check her temperature.  She also developed cold.  Sore throat, sores in her mouth 2 days ago.  It is stinging.  Denies any problems with swallowing.  She tried Advil, her kids nebulizer as well as cold medication.  Feels that the cough is better but the sores are uncomfortable.  She denies having similar sores in the past.  No chronic lung disease.  Quit smoking 10 years ago, 1 pack per day for around 15 years.    Review of Systems  As above otherwise negative.          Objective:    Physical Exam  /80 (Patient Site: Right Arm, Patient Position: Sitting, Cuff Size: Adult Regular)  Wt 158 lb 5 oz (71.8 kg)  BMI 26.34 kg/m2    Vital signs noted above. AAO ×3.  HEENT no nasal discharge, moist oral mucosa.  Superficial ulcers noted in her soft and hard palate, posterior pharyngeal wall.  No active discharge or bleeding.  Slightly hyperemic posterior pharyngeal wall.  Ears:TMs intact, no fluid collection. Neck: Supple neck, nonpalpable cervical lymph nodes.  Lungs: Clear to auscultation bilateral.  Heart: S1-S2  regular rate and rhythm, no murmurs were noted.  Abdomen:  with bowel sounds.

## 2021-06-13 NOTE — PROGRESS NOTES
Assessment/Plan:     1. URI (upper respiratory infection)  Rapid strep and flu done per patient's request which were negative.  Did discuss we could possibly get labs for further workup screen for mono CBCs check for dehydration since she is drinking less she declines.  At this point I do not see any benefit for antibiotics but certainly if symptoms are worsening or not improving patient could call or return to care.  I recommend he continue to use mouthwash will give prednisone to help with inflammation.  Supportive care discussed.  Gave note for work.  Commended continuing to take small sips and staying hydrated to avoid dehydration.  - Rapid Strep A Screen-Throat  - Group A Strep, RNA Direct Detection, Throat  - Influenza A/B Rapid Test  - predniSONE (DELTASONE) 10 mg tablet; 4 TABLETS DAILY FOR 3 DAYS,THEN TAKE 3 TABLETS DAILY FOR 3 DAYSTHEN TAKE 2 TABLETS DAILY FOR 3 DAYS,THEN TAKE 1 TABLET DAILY FOR 3 DAYS  Dispense: 30 tablet; Refill: 0        Subjective:      Haley Fonseca is a 47 y.o. female comes in today to follow-up on an illness.  She was seen 2 days ago at another clinic for this illness.  States that she started feeling ill just a few days prior.  She feels very fatigued she has not measured her temperature but has felt a bit hot no significant chills or sweating.  Her throat bothers her the will she states that it is sort feels like it swollen it feels like the skin is sloughing off.  She states that she has tried over-the-counter cold medicine at home she is using ibuprofen.  She is using her daughter's nebulizer although she denies any significant coughing tightness in chest or problems breathing.  She was had a rapid strep that was negative.  Reviewed labs and visit note.  She was given Magic mouthwash she states it does help her feel better for about 15 minutes but then comes back.  He denies concerns with her ears eyes or GI concerns.  She has not had any lesions on her palms or soles.  Her  daughter is not sick and she has had no known ill contacts.  States her biggest concern is feeling rundown and tired.  So she does not want to give her daughter something because she apparently has very labile asthma.  Line she states that she has not had any recent new sexual contacts she is  and does not have any concern for STDs or any recent exposures.    Current Outpatient Prescriptions   Medication Sig Dispense Refill     alum/mag hydrox-simethicone-diphenhydramine-lidocaine (MAGIC MOUTHWASH) suspension Swish and spit 15 mL 4 (four) times a day. Swish and spit as directed 120 mL 0     cyclobenzaprine (FLEXERIL) 10 MG tablet TAKE ONE TABLET BY MOUTH THREE TIMES DAILY AS NEEDED FOR MUSCLE SPASM 30 tablet 5     diphenhydrAMINE 12.5 mg/5 mL Elix 50 mg, aluminum-magnesium hydroxide-simethicone 400-400-40 mg/5 mL Susp 20 mL, viscous lidocaine HC 2 % Soln 20 mL Swish and spit 15 mL every 4 (four) hours as needed. 120 mL 0     ibuprofen (ADVIL,MOTRIN) 800 MG tablet TAKE ONE TABLET BY MOUTH AT BEDTIME 30 tablet 5     MULTIVITAMIN ORAL Take by mouth daily.       predniSONE (DELTASONE) 10 mg tablet 4 TABLETS DAILY FOR 3 DAYS,THEN TAKE 3 TABLETS DAILY FOR 3 DAYSTHEN TAKE 2 TABLETS DAILY FOR 3 DAYS,THEN TAKE 1 TABLET DAILY FOR 3 DAYS 30 tablet 0     No current facility-administered medications for this visit.      No Known Allergies  Past Medical History, Family History, and Social History reviewed.  No past medical history on file.  No past surgical history on file.  Review of patient's allergies indicates no known allergies.  Family History   Problem Relation Age of Onset     Hypertension Mother      Hypertension Father      Hypertension Maternal Grandmother      Hypertension Paternal Grandmother      Social History     Social History     Marital status:      Spouse name: N/A     Number of children: N/A     Years of education: N/A     Occupational History     Not on file.     Social History Main Topics      "Smoking status: Former Smoker     Smokeless tobacco: Not on file     Alcohol use 7.7 oz/week     7 Cans of beer, 7 Standard drinks or equivalent per week      Comment: 14 drink total per week     Drug use: No     Sexual activity: Yes     Partners: Male      Comment:      Other Topics Concern     Not on file     Social History Narrative         Review of systems is as stated in HPI, and the remainder of the 10 system review is otherwise negative.    Objective:     Vitals:    11/01/17 1256 11/01/17 1259   BP: (!) 150/94 148/90   Pulse: (!) 120    Temp:  99.5  F (37.5  C)   TempSrc:  Oral   Weight: 154 lb 3.2 oz (69.9 kg) 154 lb 3.2 oz (69.9 kg)   Height: 5' 5.5\" (1.664 m) 5' 5.5\" (1.664 m)    Body mass index is 25.27 kg/(m^2).  Wt Readings from Last 3 Encounters:   11/01/17 154 lb 3.2 oz (69.9 kg)   10/30/17 158 lb 5 oz (71.8 kg)   03/13/17 166 lb 1.6 oz (75.3 kg)       General Appearance:    Alert, cooperative, no distress, appears stated age    Head:    Normocephalic, without obvious abnormality, atraumatic   Eyes:    PERRL, EOM's intact, no conjunctivitis    Ears:    Normal TM's and external ear canals   Nose:   Mucosa normal, no drainage     or sinus tenderness   Throat:  Patient has several shallow ulcers in her oral cavity on both of mouth and side.  There is no discharge from these areas.  There is no sign of abscess or significant swelling.  Otherwise oropharynx is clear   Neck:   Supple, symmetrical, patient does have cervical lymphadenopathy, and no thyromegally, no carotid bruit        Lungs:     Clear to auscultation bilaterally, respirations unlabored   Chest Wall:    No tenderness or deformity    Heart:    Regular rate and rhythm, S1 and S2 normal, no murmur, rub    or gallop                   Extremities:   Extremities normal, atraumatic, no cyanosis or edema           Psych:   grossly normal mood and affect without acute anxiety or psychosis    Skin:   No rashes or lesions   :          This note " has been dictated using voice recognition software. Any grammatical or context distortions are unintentional and inherent to the software.

## 2021-06-14 NOTE — PROGRESS NOTES
Assessment/Plan:     1. Benign essential hypertension  losartan (COZAAR) 25 MG tablet    Basic Metabolic Panel   2. Muscle spasm  cyclobenzaprine (FLEXERIL) 10 MG tablet   3. Visit for screening mammogram  Mammo Screening Bilateral       Diagnoses and all orders for this visit:    Benign essential hypertension  -     losartan (COZAAR) 25 MG tablet; Take 1 tablet (25 mg total) by mouth daily.  Dispense: 90 tablet; Refill: 3  -     Basic Metabolic Panel  -Blood pressure is controlled with current medication.  Refill provided.  Check basic metabolic panel today.  Encouraged regular exercise, healthy diet.  Follow-up in 1 year or sooner if needed    Muscle spasm  -     cyclobenzaprine (FLEXERIL) 10 MG tablet; TAKE ONE TABLET BY MOUTH AT BEDTIME AS NEEDED FOR MUSCLE SPASMS  Dispense: 90 tablet; Refill: 3  -She continues to receive benefit with cyclobenzaprine.  Refill provided    Visit for screening mammogram  -     Mammo Screening Bilateral; Future; Expected date: 12/28/2020    Healthcare maintenance: She is due for tetanus vaccine.  She declines to have this vaccine today.  She will plan to get this with her physical.  She will plan to schedule physical and Pap smear next year.  We will plan to do fasting labs at that time.  Order placed for mammogram.         The following high BMI interventions were performed this visit: encouragement to exercise    Subjective:      Haley Fonseca is a 50 y.o. female who comes in today to follow-up on hypertension.  She is currently taking losartan 25 mg daily.  This medication is working well for her.  She reports no concerning adverse side effects.  She has lost a couple of pounds since her last office visit.  She is trying to exercise regularly and follow a healthy diet.  She continues to use cyclobenzaprine at bedtime.  This helps with muscle tightness and soreness and helps her sleep through the night.  She takes no other regular medications.  Medications and allergies are  reviewed and updated.  She reports no other changes in health since she was last seen in clinic.  She is due for mammogram.  Review of systems is assessed.  She reports no concerns with chest pain or pressure.  No dyspnea with exertion.  No lower extremity edema.  No other questions today.    Current Outpatient Medications   Medication Sig Dispense Refill     cyclobenzaprine (FLEXERIL) 10 MG tablet TAKE ONE TABLET BY MOUTH AT BEDTIME AS NEEDED FOR MUSCLE SPASMS 90 tablet 3     ibuprofen (ADVIL,MOTRIN) 800 MG tablet TAKE ONE TABLET BY MOUTH AT BEDTIME 30 tablet 5     losartan (COZAAR) 25 MG tablet Take 1 tablet (25 mg total) by mouth daily. 90 tablet 3     MULTIVITAMIN ORAL Take by mouth daily.       No current facility-administered medications for this visit.        Past Medical History, Family History, and Social History reviewed.  No past medical history on file.  No past surgical history on file.  Patient has no known allergies.  Family History   Problem Relation Age of Onset     Hypertension Mother      Hypertension Father      Heart disease Father      Hypertension Maternal Grandmother      Hypertension Paternal Grandmother      Social History     Socioeconomic History     Marital status:      Spouse name: Not on file     Number of children: Not on file     Years of education: Not on file     Highest education level: Not on file   Occupational History     Not on file   Social Needs     Financial resource strain: Not on file     Food insecurity     Worry: Not on file     Inability: Not on file     Transportation needs     Medical: Not on file     Non-medical: Not on file   Tobacco Use     Smoking status: Former Smoker     Smokeless tobacco: Never Used   Substance and Sexual Activity     Alcohol use: Yes     Alcohol/week: 12.8 standard drinks     Types: 7 Cans of beer, 7 Standard drinks or equivalent per week     Comment: 14 drink total per week     Drug use: No     Sexual activity: Yes     Partners: Male  "    Comment:    Lifestyle     Physical activity     Days per week: Not on file     Minutes per session: Not on file     Stress: Not on file   Relationships     Social connections     Talks on phone: Not on file     Gets together: Not on file     Attends L[employer]-day service: Not on file     Active member of club or organization: Not on file     Attends meetings of clubs or organizations: Not on file     Relationship status: Not on file     Intimate partner violence     Fear of current or ex partner: Not on file     Emotionally abused: Not on file     Physically abused: Not on file     Forced sexual activity: Not on file   Other Topics Concern     Not on file   Social History Narrative     Not on file         Review of systems is as stated in HPI, and the remainder of the 10 system review is otherwise negative.    Objective:     Vitals:    12/28/20 1028   BP: 122/86   Patient Site: Left Arm   Patient Position: Sitting   Cuff Size: Adult Regular   Pulse: 82   SpO2: 96%   Weight: 168 lb 11.2 oz (76.5 kg)   Height: 5' 5.16\" (1.655 m)    Body mass index is 27.94 kg/m .    General appearance: alert, appears stated age and cooperative  Head: Normocephalic, without obvious abnormality, atraumatic  Lungs: clear to auscultation bilaterally  Heart: regular rate and rhythm, S1, S2 normal, no murmur, click, rub or gallop  Extremities: extremities normal, atraumatic, no cyanosis or edema  Neurologic: Grossly normal          This note has been dictated using voice recognition software. Any grammatical or context distortions are unintentional and inherent to the the software.       "

## 2021-06-17 NOTE — PATIENT INSTRUCTIONS - HE
Patient Instructions by Torie Tam MD at 8/14/2019  2:00 PM     Author: Torie Tam MD Service: -- Author Type: Physician    Filed: 8/14/2019  2:35 PM Encounter Date: 8/14/2019 Status: Signed    : Torie Tam MD (Physician)         Patient Education     Controlling High Blood Pressure  High blood pressure (hypertension) is often called the silent killer. This is because many people who have it dont know it. High blood pressure can raise your risk of heart attack, stroke, and heart failure. Controlling your blood pressure can decrease your risk of these problems. Know your blood pressure and remember to check it regularly. Doing so can save your life.  Blood pressure measurements are given as 2 numbers. Systolic blood pressure is the upper number. This is the pressure when the heart contracts. Diastolic blood pressure is the lower number. This is the pressure when the heart relaxes between beats.  Blood pressure is categorized as normal, elevated, or stage 1 or stage 2 high blood pressure:    Normal blood pressure is systolic of less than 120 and diastolic of less than 80 (120/80)    Elevated blood pressure is systolic of 120 to 129 and diastolic less than 80    Stage 1 high blood pressure is systolic is 130 to 139 or diastolic between 80 to 89    Stage 2 high blood pressure is when systolic is 140 or higher or the diastolic is 90 or higher  Here are some things you can do to help control your blood pressure.    Choose heart-healthy foods    Select low-salt, low-fat foods. Limit sodium intake to 2,400 mg per day or the amount suggested by your healthcare provider.    Limit canned, dried, cured, packaged, and fast foods. These can contain a lot of salt.    Eat 8 to 10 servings of fruits and vegetables every day.    Choose lean meats, fish, or chicken.    Eat whole-grain pasta, brown rice, and beans.    Eat 2 to 3 servings of low-fat or fat-free dairy products.    Ask your doctor about the DASH eating  plan. This plan helps reduce blood pressure.    When you go to a restaurant, ask that your meal be prepared with no added salt.  Maintain a healthy weight    Ask your healthcare provider how many calories to eat a day. Then stick to that number.    Ask your healthcare provider what weight range is healthiest for you. If you are overweight, a weight loss of only 3% to 5% of your body weight can help lower blood pressure. Generally, a good weight loss goal is to lose 10% of your body weight in a year.    Limit snacks and sweets.    Get regular exercise.  Get up and get active    Choose activities you enjoy. Find ones you can do with friends or family. This includes bicycling, dancing, walking, and jogging.    Park farther away from building entrances.    Use stairs instead of the elevator.    When you can, walk or bike instead of driving.    [place] leaves, garden, or do household repairs.    Be active at a moderate to vigorous level of physical activity for at least 40 minutes for a minimum of 3 to 4 days a week.   Manage stress    Make time to relax and enjoy life. Find time to laugh.    Communicate your concerns with your loved ones and your healthcare provider.    Visit with family and friends, and keep up with hobbies.  Limit alcohol and quit smoking    Men should have no more than 2 drinks per day.    Women should have no more than 1 drink per day.    Talk with your healthcare provider about quitting smoking. Smoking significantly increases your risk for heart disease and stroke. Ask your healthcare provider about community smoking cessation programs and other options.  Medicines  If lifestyle changes arent enough, your healthcare provider may prescribe high blood pressure medicine. Take all medicines as prescribed. If you have any questions about your medicines, ask your healthcare provider before stopping or changing them.   Date Last Reviewed: 4/27/2016 2000-2017 [employer]. [address], [place] [address]. All rights reserved. This information is not intended as a substitute for professional medical care. Always follow your healthcare professional's instructions.           Patient Education     Eating Heart-Healthy Food: Using the DASH Plan    Eating for your heart doesnt have to be hard or boring. You just need to know how to make healthier choices. The DASH eating plan has been developed to help you do just that. DASH stands for Dietary Approaches to Stop Hypertension. It is a plan that has been proven to be healthier for your heart and to lower your risk for high blood pressure. It can also help lower your risk for cancer, heart disease, osteoporosis, and diabetes.  Choosing from each food group  Choose foods from each of the food groups below each day. Try to get the recommended number of servings for each food group. The serving numbers are based on a diet of 2,000 calories a day. Talk to your doctor if youre unsure about your calorie needs. Along with getting the correct servings, the DASH plan also recommends a sodium intake less than 2,300 mg per day.        Grains  Servings: 6 to 8 a day  A serving is:    1 slice bread    1 ounce dry cereal    Half a cup cooked rice, pasta or cereal  Best choices: Whole grains and any grains high in fiber. Vegetables  Servings: 4 to 5 a day  A serving is:    1 cup raw leafy vegetable    Half a cup cut-up raw or cooked vegetable    Half a cup vegetable juice  Best choices: Fresh or frozen vegetables prepared without added salt or fat.   Fruits  Servings: 4 to 5 a day  A serving is:    1 medium fruit    One-quarter cup dried fruit    Half a cup fresh, frozen, or canned fruit    Half a cup of 100% fruit juices  Best choices: A variety of fresh fruits of different colors. Whole fruits are a better choice than fruit juices. Low-fat or fat-free dairy  Servings: 2 to 3 a day  A serving is:    1 cup milk    1 cup yogurt    One and a half ounces cheese  Best  choices: Skim or 1% milk, low-fat or fat-free yogurt or buttermilk, and low-fat cheeses.         Lean meats, poultry, fish  Servings: 6 or fewer a day  A serving is:    1 ounce cooked meats, poultry, or fish    1 egg  Best choices: Lean poultry and fish. Trim away visible fat. Broil, grill, roast, or boil instead of frying. Remove skin from poultry before eating. Limit how much red meat you eat.  Nuts, seeds, beans  Servings: 4 to 5 a week  A serving is:    One-third cup nuts (one and a half ounces)    2 tablespoons nut butter or seeds    Half a cup cooked dry beans or legumes  Best choices: Dry roasted nuts with no salt added, lentils, kidney beans, garbanzo beans, and whole connolly beans.   Fats and oils  Servings: 2 to 3 a day  A serving is:    1 teaspoon vegetable oil    1 teaspoon soft margarine    1 tablespoon mayonnaise    2 tablespoons salad dressing  Best choices: Nut and vegetable oils (nontropical vegetable oils), such as olive and canola oil. Sweets  Servings: 5 a week or fewer  A serving is:    1 tablespoon sugar, maple syrup, or honey    1 tablespoon jam or jelly    1 half-ounce jelly beans (about 15)    1 cup lemonade  Best choices: Dried fruit can be a satisfying sweet. Choose low-fat sweets. And watch your serving sizes!      For more on the DASH eating plan, visit:  www.nhlbi.nih.gov/health/health-topics/topics/dash   Date Last Reviewed: 6/1/2016 2000-2017 [employer]. [address] [place] [address], [place] [address]. All rights reserved. This information is not intended as a substitute for professional medical care. Always follow your healthcare professional's instructions.

## 2021-06-20 NOTE — LETTER
Letter by Sherrie Hager at      Author: Sherrie Hager Service: -- Author Type: --    Filed:  Encounter Date: 6/11/2020 Status: (Other)         June 11, 2020       Haley WHITESIDE Marian  [address] [facility] [address]    Dear Haley Fonseca:    We are pleased to provide you with secure, online access to medical information for you and your family within [facility] Tumblr. Per your request, we have expanded your account to allow access to the records of the following family members:              Imtiaz Fonseca (privilege ends on 8/10/2020.)     How Do I Log In?  1. In your Internet browser, go to [email or web address]  2. Log into Tumblr using your Tumblr Username and Password.  3. Click Sign In.        How Do I Access a Family Member's Account?  4. Select the account you want to access by clicking the [place] with the appropriate patient's name at the top of your screen.   5. You will see a disclaimer page letting you know that you will be viewing a family member's record. Review the disclaimer and then click Accept Proxy Access Disclaimer to proceed.  6. Once you switch to viewing a family member's record, you can navigate to Tumblr pages the same way you would for yourself. You can return to your own account by clicking the [place] at the top of the screen with your name on it.    7. To customize the colors and names of the linked accounts, you can select Personalize from the Profile dropdown menu at the top of the screen, then click the Edit button to make changes.     Additional Information  If you have questions, visit [email or web address], e-mail [email or web address] or call [phone] to talk to our Tumblr staff. Remember, Tumblr is NOT to be used for urgent needs. For medical emergencies, dial 911.

## 2021-06-20 NOTE — LETTER
Letter by Torie Boudreaux MD at      Author: Torie Boudreaux MD Service: -- Author Type: --    Filed:  Encounter Date: 10/1/2020 Status: (Other)         Haley Fonseca  [address] [place] [id number]      October 1, 2020      Dear Haley Fonseca,    Per our refill protocol, you are due for a medication check office visit. Your prescription for COZAAR was sent to your pharmacy (10.1.2020). Please call [phone] to schedule an office visit with DR. BOUDREAUX before your next refill is needed to avoid delays.    Thank you,  [facility]

## 2021-06-23 NOTE — TELEPHONE ENCOUNTER
RN cannot approve Refill Request    RN can NOT refill this medication med is not covered by policy/route to provider. Last office visit: Visit date not found Last Physical: 4/23/2015 Last MTM visit: Visit date not found Last visit same specialty: 11/1/2017 Stephy Kurtz DO.  Next visit within 3 mo: Visit date not found  Next physical within 3 mo: Visit date not found      Teressa Patel, [facility] Triage/Med Refill 1/20/2019    Requested Prescriptions   Pending Prescriptions Disp Refills     cyclobenzaprine (FLEXERIL) 10 MG tablet [Pharmacy Med Name: CYCLOBENZAPRINE HCL 10MG TABS] 30 tablet 5     Sig: TAKE ONE TABLET BY MOUTH THREE TIMES A DAY AS NEEDED FOR MUSCLE SPASMS    There is no refill protocol information for this order

## 2021-06-23 NOTE — TELEPHONE ENCOUNTER
RN cannot approve Refill Request    RN can NOT refill this medication med is not covered by policy/route to provider. Last office visit: Visit date not found Last Physical: 4/23/2015 Last MTM visit: Visit date not found Last visit same specialty: 11/1/2017 Stephy Kurtz DO.  Next visit within 3 mo: Visit date not found  Next physical within 3 mo: Visit date not found      Guillermina Marcial, [facility] Triage/Med Refill 1/21/2019    Requested Prescriptions   Pending Prescriptions Disp Refills     cyclobenzaprine (FLEXERIL) 10 MG tablet 30 tablet 0    There is no refill protocol information for this order

## 2021-06-30 ENCOUNTER — AMBULATORY - HEALTHEAST (OUTPATIENT)
Dept: FAMILY MEDICINE | Facility: CLINIC | Age: 51
End: 2021-06-30

## 2021-06-30 DIAGNOSIS — I83.90 VARICOSE VEINS OF LOWER EXTREMITY, UNSPECIFIED LATERALITY, UNSPECIFIED WHETHER COMPLICATED: ICD-10-CM

## 2021-07-03 NOTE — ADDENDUM NOTE
Addendum Note by Mirtha Stuart MD at 8/27/2019  8:00 AM     Author: Mirtha Stuart MD Service: -- Author Type: Physician    Filed: 8/27/2019 10:27 AM Encounter Date: 8/27/2019 Status: Signed    : Mirtha Stuart MD (Physician)    Addended by: MIRTHA STUART on: 8/27/2019 10:27 AM        Modules accepted: Orders

## 2021-07-08 ENCOUNTER — COMMUNICATION - HEALTHEAST (OUTPATIENT)
Dept: FAMILY MEDICINE | Facility: CLINIC | Age: 51
End: 2021-07-08

## 2021-09-19 ENCOUNTER — HEALTH MAINTENANCE LETTER (OUTPATIENT)
Age: 51
End: 2021-09-19

## 2021-09-24 ENCOUNTER — OFFICE VISIT (OUTPATIENT)
Dept: FAMILY MEDICINE | Facility: CLINIC | Age: 51
End: 2021-09-24
Payer: COMMERCIAL

## 2021-09-24 VITALS
OXYGEN SATURATION: 96 % | WEIGHT: 170.8 LBS | BODY MASS INDEX: 28.29 KG/M2 | HEART RATE: 72 BPM | SYSTOLIC BLOOD PRESSURE: 132 MMHG | DIASTOLIC BLOOD PRESSURE: 80 MMHG

## 2021-09-24 DIAGNOSIS — Z12.31 VISIT FOR SCREENING MAMMOGRAM: ICD-10-CM

## 2021-09-24 DIAGNOSIS — L65.9 HAIR LOSS: Primary | ICD-10-CM

## 2021-09-24 LAB
ALBUMIN SERPL-MCNC: 4.4 G/DL (ref 3.5–5)
ALP SERPL-CCNC: 41 U/L (ref 45–120)
ALT SERPL W P-5'-P-CCNC: 20 U/L (ref 0–45)
ANION GAP SERPL CALCULATED.3IONS-SCNC: 14 MMOL/L (ref 5–18)
AST SERPL W P-5'-P-CCNC: 21 U/L (ref 0–40)
BASOPHILS # BLD AUTO: 0.1 10E3/UL (ref 0–0.2)
BASOPHILS NFR BLD AUTO: 1 %
BILIRUB SERPL-MCNC: 0.6 MG/DL (ref 0–1)
BUN SERPL-MCNC: 14 MG/DL (ref 8–22)
C REACTIVE PROTEIN LHE: <0.1 MG/DL (ref 0–0.8)
CALCIUM SERPL-MCNC: 9.8 MG/DL (ref 8.5–10.5)
CHLORIDE BLD-SCNC: 105 MMOL/L (ref 98–107)
CO2 SERPL-SCNC: 22 MMOL/L (ref 22–31)
CREAT SERPL-MCNC: 0.78 MG/DL (ref 0.6–1.1)
EOSINOPHIL # BLD AUTO: 0.2 10E3/UL (ref 0–0.7)
EOSINOPHIL NFR BLD AUTO: 3 %
ERYTHROCYTE [DISTWIDTH] IN BLOOD BY AUTOMATED COUNT: 12.5 % (ref 10–15)
ERYTHROCYTE [SEDIMENTATION RATE] IN BLOOD BY WESTERGREN METHOD: 2 MM/HR (ref 0–20)
FERRITIN SERPL-MCNC: 103 NG/ML (ref 10–130)
GFR SERPL CREATININE-BSD FRML MDRD: 90 ML/MIN/1.73M2
GLUCOSE BLD-MCNC: 88 MG/DL (ref 70–125)
HCT VFR BLD AUTO: 40.8 % (ref 35–47)
HGB BLD-MCNC: 13.7 G/DL (ref 11.7–15.7)
IMM GRANULOCYTES # BLD: 0 10E3/UL
IMM GRANULOCYTES NFR BLD: 0 %
IRON SATN MFR SERPL: 33 % (ref 15–46)
IRON SERPL-MCNC: 99 UG/DL (ref 35–180)
LYMPHOCYTES # BLD AUTO: 1.7 10E3/UL (ref 0.8–5.3)
LYMPHOCYTES NFR BLD AUTO: 25 %
MCH RBC QN AUTO: 31.1 PG (ref 26.5–33)
MCHC RBC AUTO-ENTMCNC: 33.6 G/DL (ref 31.5–36.5)
MCV RBC AUTO: 93 FL (ref 78–100)
MONOCYTES # BLD AUTO: 0.5 10E3/UL (ref 0–1.3)
MONOCYTES NFR BLD AUTO: 8 %
NEUTROPHILS # BLD AUTO: 4.3 10E3/UL (ref 1.6–8.3)
NEUTROPHILS NFR BLD AUTO: 64 %
PLATELET # BLD AUTO: 238 10E3/UL (ref 150–450)
POTASSIUM BLD-SCNC: 4.4 MMOL/L (ref 3.5–5)
PROT SERPL-MCNC: 7 G/DL (ref 6–8)
RBC # BLD AUTO: 4.41 10E6/UL (ref 3.8–5.2)
SODIUM SERPL-SCNC: 141 MMOL/L (ref 136–145)
T4 FREE SERPL-MCNC: 0.93 NG/DL (ref 0.7–1.8)
TIBC SERPL-MCNC: 302 UG/DL (ref 240–430)
TSH SERPL DL<=0.005 MIU/L-ACNC: 1.24 UIU/ML (ref 0.3–5)
WBC # BLD AUTO: 6.7 10E3/UL (ref 4–11)

## 2021-09-24 PROCEDURE — 86141 C-REACTIVE PROTEIN HS: CPT | Performed by: FAMILY MEDICINE

## 2021-09-24 PROCEDURE — 80050 GENERAL HEALTH PANEL: CPT | Performed by: FAMILY MEDICINE

## 2021-09-24 PROCEDURE — 85652 RBC SED RATE AUTOMATED: CPT | Performed by: FAMILY MEDICINE

## 2021-09-24 PROCEDURE — 82728 ASSAY OF FERRITIN: CPT | Performed by: FAMILY MEDICINE

## 2021-09-24 PROCEDURE — 36415 COLL VENOUS BLD VENIPUNCTURE: CPT | Performed by: FAMILY MEDICINE

## 2021-09-24 PROCEDURE — 83550 IRON BINDING TEST: CPT | Performed by: FAMILY MEDICINE

## 2021-09-24 PROCEDURE — 90471 IMMUNIZATION ADMIN: CPT | Performed by: FAMILY MEDICINE

## 2021-09-24 PROCEDURE — 86038 ANTINUCLEAR ANTIBODIES: CPT | Performed by: FAMILY MEDICINE

## 2021-09-24 PROCEDURE — 90682 RIV4 VACC RECOMBINANT DNA IM: CPT | Performed by: FAMILY MEDICINE

## 2021-09-24 PROCEDURE — 99214 OFFICE O/P EST MOD 30 MIN: CPT | Mod: 25 | Performed by: FAMILY MEDICINE

## 2021-09-24 PROCEDURE — 82306 VITAMIN D 25 HYDROXY: CPT | Performed by: FAMILY MEDICINE

## 2021-09-24 PROCEDURE — 84439 ASSAY OF FREE THYROXINE: CPT | Performed by: FAMILY MEDICINE

## 2021-09-24 NOTE — PATIENT INSTRUCTIONS
We will check labs today.  If everything looks good on lab work, we will plan to refer you to dermatology for further evaluation

## 2021-09-27 DIAGNOSIS — L65.9 HAIR LOSS: Primary | ICD-10-CM

## 2021-09-27 LAB
ANA SER QL IF: NEGATIVE
DEPRECATED CALCIDIOL+CALCIFEROL SERPL-MC: 41 UG/L (ref 30–80)

## 2021-10-04 ENCOUNTER — MYC MEDICAL ADVICE (OUTPATIENT)
Dept: FAMILY MEDICINE | Facility: CLINIC | Age: 51
End: 2021-10-04

## 2021-10-04 DIAGNOSIS — L65.9 HAIR LOSS: Primary | ICD-10-CM

## 2022-01-21 ENCOUNTER — TRANSFERRED RECORDS (OUTPATIENT)
Dept: HEALTH INFORMATION MANAGEMENT | Facility: CLINIC | Age: 52
End: 2022-01-21
Payer: COMMERCIAL

## 2022-01-24 DIAGNOSIS — M62.838 MUSCLE SPASM: ICD-10-CM

## 2022-01-25 NOTE — TELEPHONE ENCOUNTER
Routing refill request to provider for review/approval because:  Drug not [place] [facility] [place]     Last Written Prescription Date:  12/28/2020  Last Fill Quantity: 90,  # refills: 3   Last office visit provider:  9/24/2021     Requested Prescriptions   Pending Prescriptions Disp Refills     cyclobenzaprine (FLEXERIL) 10 MG tablet [Pharmacy Med Name: CYCLOBENZAPRINE HCL 10MG TABS] 90 tablet 3     Sig: TAKE ONE TABLET BY MOUTH AT BEDTIME AS NEEDED FOR MUSCLE SPASMS       There is no refill protocol information for this order          Donna Moya RN 01/25/22 4:29 PM

## 2022-01-26 RX ORDER — CYCLOBENZAPRINE HCL 10 MG
TABLET ORAL
Qty: 90 TABLET | Refills: 3 | Status: SHIPPED | OUTPATIENT
Start: 2022-01-26 | End: 2023-01-22

## 2022-02-10 ENCOUNTER — HOSPITAL ENCOUNTER (OUTPATIENT)
Dept: MAMMOGRAPHY | Facility: CLINIC | Age: 52
Discharge: HOME | End: 2022-02-10
Attending: FAMILY MEDICINE | Admitting: FAMILY MEDICINE
Payer: COMMERCIAL

## 2022-02-10 DIAGNOSIS — Z12.31 VISIT FOR SCREENING MAMMOGRAM: ICD-10-CM

## 2022-02-10 PROCEDURE — 77067 SCR MAMMO BI INCL CAD: CPT

## 2022-02-18 ENCOUNTER — HOSPITAL ENCOUNTER (OUTPATIENT)
Dept: ULTRASOUND IMAGING | Facility: CLINIC | Age: 52
End: 2022-02-18
Attending: FAMILY MEDICINE
Payer: COMMERCIAL

## 2022-02-18 ENCOUNTER — HOSPITAL ENCOUNTER (OUTPATIENT)
Dept: MAMMOGRAPHY | Facility: CLINIC | Age: 52
End: 2022-02-18
Attending: FAMILY MEDICINE
Payer: COMMERCIAL

## 2022-02-18 DIAGNOSIS — N64.89 BREAST ASYMMETRY: ICD-10-CM

## 2022-02-18 PROCEDURE — 77061 BREAST TOMOSYNTHESIS UNI: CPT | Mod: LT

## 2022-02-18 PROCEDURE — 76642 ULTRASOUND BREAST LIMITED: CPT | Mod: LT

## 2022-02-22 ENCOUNTER — TELEPHONE (OUTPATIENT)
Dept: FAMILY MEDICINE | Facility: CLINIC | Age: 52
End: 2022-02-22
Payer: COMMERCIAL

## 2022-02-22 DIAGNOSIS — N63.20 LEFT BREAST MASS: Primary | ICD-10-CM

## 2022-02-22 NOTE — TELEPHONE ENCOUNTER
Recvd call from radiology scheduling, pt/Haley would like to schedule her 6 mnth diagnostic mammogram follow up (from yesterdays apt) and [facility] is calling to see if Dr Torie Tam could place the order so they can reach bk out to her to assist with scheduling. Haley would like to get the 6 mnth follow up scheduled now.

## 2022-03-21 DIAGNOSIS — I10 BENIGN ESSENTIAL HYPERTENSION: ICD-10-CM

## 2022-03-22 RX ORDER — LOSARTAN POTASSIUM 25 MG/1
TABLET ORAL
Qty: 90 TABLET | Refills: 2 | Status: SHIPPED | OUTPATIENT
Start: 2022-03-22 | End: 2022-10-05

## 2022-05-17 ENCOUNTER — TRANSFERRED RECORDS (OUTPATIENT)
Dept: HEALTH INFORMATION MANAGEMENT | Facility: CLINIC | Age: 52
End: 2022-05-17
Payer: COMMERCIAL

## 2022-06-26 ENCOUNTER — HEALTH MAINTENANCE LETTER (OUTPATIENT)
Age: 52
End: 2022-06-26

## 2022-08-17 NOTE — TELEPHONE ENCOUNTER
"Last Written Prescription Date:  12/28/2020  Last Fill Quantity: 90,  # refills: 3   Last office visit provider:  9/24/2021     Requested Prescriptions   Pending Prescriptions Disp Refills     losartan (COZAAR) 25 MG tablet [Pharmacy Med Name: LOSARTAN POTASSIUM 25MG TABS] 90 tablet 3     Sig: TAKE ONE TABLET BY MOUTH EVERY DAY       Angiotensin-II Receptors Passed - 3/21/2022  5:07 AM        Passed - Last blood pressure under 140/90 in past 12 months     BP Readings from Last 3 Encounters:   09/24/21 132/80   12/28/20 122/86                 Passed - Recent (12 mo) or future (30 days) visit within the authorizing provider's specialty     Patient has had an office visit with the authorizing provider or a provider within the authorizing providers department within the previous 12 mos or has a future within next 30 days. See \"Patient Info\" tab in inbasket, or \"Choose Columns\" in Meds & Orders section of the refill encounter.              Passed - Medication is active on med list        Passed - Patient is age 18 or older        Passed - No active pregnancy on record        Passed - Normal serum creatinine on file in past 12 months     Recent Labs   Lab Test 09/24/21  0904   CR 0.78       Ok to refill medication if creatinine is low          Passed - Normal serum potassium on file in past 12 months     Recent Labs   Lab Test 09/24/21  0904   POTASSIUM 4.4                    Passed - No positive pregnancy test in past 12 months             Lana Maki RN 03/22/22 9:38 AM  " Quality 226: Preventive Care And Screening: Tobacco Use: Screening And Cessation Intervention: Patient screened for tobacco use and is an ex/non-smoker Quality 130: Documentation Of Current Medications In The Medical Record: Current Medications Documented Detail Level: Detailed

## 2022-09-02 ENCOUNTER — HOSPITAL ENCOUNTER (OUTPATIENT)
Dept: GENERAL RADIOLOGY | Facility: HOSPITAL | Age: 52
Discharge: HOME | End: 2022-09-02
Attending: PHYSICIAN ASSISTANT | Admitting: PHYSICIAN ASSISTANT
Payer: COMMERCIAL

## 2022-09-02 ENCOUNTER — OFFICE VISIT (OUTPATIENT)
Dept: FAMILY MEDICINE | Facility: CLINIC | Age: 52
End: 2022-09-02
Payer: COMMERCIAL

## 2022-09-02 VITALS — OXYGEN SATURATION: 97 % | SYSTOLIC BLOOD PRESSURE: 133 MMHG | DIASTOLIC BLOOD PRESSURE: 89 MMHG | HEART RATE: 65 BPM

## 2022-09-02 DIAGNOSIS — R07.89 CHEST WALL PAIN: ICD-10-CM

## 2022-09-02 DIAGNOSIS — S22.31XA CLOSED FRACTURE OF ONE RIB OF RIGHT SIDE, INITIAL ENCOUNTER: Primary | ICD-10-CM

## 2022-09-02 PROCEDURE — 96372 THER/PROPH/DIAG INJ SC/IM: CPT | Performed by: PHYSICIAN ASSISTANT

## 2022-09-02 PROCEDURE — 71101 X-RAY EXAM UNILAT RIBS/CHEST: CPT | Mod: RT,FY

## 2022-09-02 PROCEDURE — 99214 OFFICE O/P EST MOD 30 MIN: CPT | Mod: 25 | Performed by: PHYSICIAN ASSISTANT

## 2022-09-02 RX ORDER — KETOROLAC TROMETHAMINE 30 MG/ML
30 INJECTION, SOLUTION INTRAMUSCULAR; INTRAVENOUS ONCE
Status: COMPLETED | OUTPATIENT
Start: 2022-09-02 | End: 2022-09-02

## 2022-09-02 RX ORDER — HYDROCODONE BITARTRATE AND ACETAMINOPHEN 5; 325 MG/1; MG/1
1 TABLET ORAL EVERY 4 HOURS PRN
Qty: 18 TABLET | Refills: 0 | Status: SHIPPED | OUTPATIENT
Start: 2022-09-02 | End: 2022-09-05

## 2022-09-02 RX ORDER — HYDROCODONE BITARTRATE AND ACETAMINOPHEN 5; 325 MG/1; MG/1
1 TABLET ORAL EVERY 4 HOURS PRN
Qty: 3 TABLET | Refills: 0 | Status: SHIPPED | OUTPATIENT
Start: 2022-09-02 | End: 2022-10-05

## 2022-09-02 RX ADMIN — KETOROLAC TROMETHAMINE 30 MG: 30 INJECTION, SOLUTION INTRAMUSCULAR; INTRAVENOUS at 18:55

## 2022-09-02 ASSESSMENT — PAIN SCALES - GENERAL: PAINLEVEL: SEVERE PAIN (6)

## 2022-09-02 NOTE — PROGRESS NOTES
Assessment & Plan     Closed fracture of one rib of right side, initial encounter  Pt seen after fall in shower, hitting R side chest wall on tub.  She is vitally normal with normal O2 sats, no respiratory distress, and CXR without PTX, effusion, or pulmonary contusion.    PI given and discussed.  Pain control with norco as ordered.    Ibuprofen/tylenol doses discussed and supplement for severe pain with norco.    At the end of the encounter, I discussed results, diagnosis, medications. Discussed red flags for immediate return to clinic/ER, as well as indications for follow up if no improvement. Patient understood and agreed to plan. Patient was stable for discharge.  PDMP reviewed , no prior hx of controlled in [place] database.     - HYDROcodone-acetaminophen (NORCO) 5-325 MG tablet  Dispense: 18 tablet; Refill: 0  - HYDROcodone-acetaminophen (NORCO) 5-325 MG tablet  Dispense: 3 tablet; Refill: 0    Chest wall pain  toradol given in clinic with some improvement in pain.    - ketorolac (TORADOL) injection 30 mg  - XR Ribs & Chest Right G/E 3 Views     30 minutes spent on the date of the encounter doing chart review, review of test results, interpretation of tests, patient visit and documentation     [facility]    Ramón Pelaez is a 52 year old female who presents to clinic today for the following health issues:  Chief Complaint   Patient presents with     Fall     Fell in shower-fell on right side about 1 hour ago     HPI  Fell in shower, hitting the R side on the tub 1 hour ago, no current sob, but was quite sob initially.    No abdomen pain.    Hurts to move and deep breath.    No hematuria noted but has menses currently.        Review of Systems  Constitutional, HEENT, cardiovascular, pulmonary, gi and gu systems are negative, except as otherwise noted.      Objective    /89   Pulse 65   SpO2 97%   Physical Exam   Pt is very uncomfortable, sitting holding  the R side.  Moves slowly and difficulty going from sit to supine secondary to pain.    Lungs CTA  Heart RRR  Abdomen: BS active, soft, ND, no masses or hsm.    Results for orders placed or performed during the hospital encounter of 09/02/22   XR Ribs & Chest Right G/E 3 Views     Status: None    Narrative    EXAM: XR RIBS and CHEST RT 3VW  LOCATION: [facility]  DATE/TIME: 9/2/2022 6:56 PM    INDICATION:  Chest wall pain  COMPARISON: None.      Impression    IMPRESSION: The visualized heart and lungs are negative. Slight cortical irregularity at the posterior aspect of the right 11th rib may reflect a nondisplaced fracture.

## 2022-09-03 NOTE — PATIENT INSTRUCTIONS
Ibuprofen 800 mg 3 x per day for pain, with food.  Next dose can be at Midnight.    Tylenol 1000 mg  4 x per day maximum : can take when you get home.    Norco for severe pain,  it has 325 mg of tylenol so make sure you are adjusting tylenol if you you need that so maximum per 24 hours is 4000 mg or less.

## 2022-10-02 ASSESSMENT — ENCOUNTER SYMPTOMS
PARESTHESIAS: 0
WEAKNESS: 0
ARTHRALGIAS: 0
NERVOUS/ANXIOUS: 0
DIARRHEA: 0
JOINT SWELLING: 0
HEADACHES: 0
BREAST MASS: 0
DIZZINESS: 0
SHORTNESS OF BREATH: 0
DYSURIA: 0
CONSTIPATION: 0
SORE THROAT: 0
MYALGIAS: 0
EYE PAIN: 0
PALPITATIONS: 0
CHILLS: 0
HEMATOCHEZIA: 0
ABDOMINAL PAIN: 0
FEVER: 0
HEARTBURN: 0
NAUSEA: 0
COUGH: 0
FREQUENCY: 0
HEMATURIA: 0

## 2022-10-05 ENCOUNTER — OFFICE VISIT (OUTPATIENT)
Dept: FAMILY MEDICINE | Facility: CLINIC | Age: 52
End: 2022-10-05
Payer: COMMERCIAL

## 2022-10-05 VITALS
HEART RATE: 74 BPM | SYSTOLIC BLOOD PRESSURE: 124 MMHG | TEMPERATURE: 98 F | OXYGEN SATURATION: 97 % | WEIGHT: 146.8 LBS | HEIGHT: 65 IN | BODY MASS INDEX: 24.46 KG/M2 | DIASTOLIC BLOOD PRESSURE: 82 MMHG

## 2022-10-05 DIAGNOSIS — Z12.11 SCREEN FOR COLON CANCER: ICD-10-CM

## 2022-10-05 DIAGNOSIS — Z00.00 ROUTINE HISTORY AND PHYSICAL EXAMINATION OF ADULT: Primary | ICD-10-CM

## 2022-10-05 DIAGNOSIS — Z12.4 CERVICAL CANCER SCREENING: ICD-10-CM

## 2022-10-05 DIAGNOSIS — I10 BENIGN ESSENTIAL HYPERTENSION: ICD-10-CM

## 2022-10-05 DIAGNOSIS — Z30.432 ENCOUNTER FOR IUD REMOVAL: ICD-10-CM

## 2022-10-05 PROCEDURE — G0145 SCR C/V CYTO,THINLAYER,RESCR: HCPCS | Performed by: FAMILY MEDICINE

## 2022-10-05 PROCEDURE — 90471 IMMUNIZATION ADMIN: CPT | Performed by: FAMILY MEDICINE

## 2022-10-05 PROCEDURE — 87624 HPV HI-RISK TYP POOLED RSLT: CPT | Performed by: FAMILY MEDICINE

## 2022-10-05 PROCEDURE — 99396 PREV VISIT EST AGE 40-64: CPT | Mod: 25 | Performed by: FAMILY MEDICINE

## 2022-10-05 PROCEDURE — 99213 OFFICE O/P EST LOW 20 MIN: CPT | Mod: 25 | Performed by: FAMILY MEDICINE

## 2022-10-05 PROCEDURE — 58301 REMOVE INTRAUTERINE DEVICE: CPT | Performed by: FAMILY MEDICINE

## 2022-10-05 PROCEDURE — 90715 TDAP VACCINE 7 YRS/> IM: CPT | Performed by: FAMILY MEDICINE

## 2022-10-05 RX ORDER — LOSARTAN POTASSIUM 25 MG/1
25 TABLET ORAL DAILY
Qty: 90 TABLET | Refills: 3 | Status: SHIPPED | OUTPATIENT
Start: 2022-10-05 | End: 2023-12-18

## 2022-10-05 ASSESSMENT — ENCOUNTER SYMPTOMS
PALPITATIONS: 0
BREAST MASS: 0
JOINT SWELLING: 0
ABDOMINAL PAIN: 0
CONSTIPATION: 0
MYALGIAS: 0
NAUSEA: 0
NERVOUS/ANXIOUS: 0
HEARTBURN: 0
COUGH: 0
DIARRHEA: 0
HEMATURIA: 0
SORE THROAT: 0
HEMATOCHEZIA: 0
HEADACHES: 0
DYSURIA: 0
FEVER: 0
ARTHRALGIAS: 0
DIZZINESS: 0
EYE PAIN: 0
WEAKNESS: 0
FREQUENCY: 0
CHILLS: 0
PARESTHESIAS: 0
SHORTNESS OF BREATH: 0

## 2022-10-05 ASSESSMENT — PAIN SCALES - GENERAL: PAINLEVEL: NO PAIN (0)

## 2022-10-05 NOTE — PROGRESS NOTES
SUBJECTIVE:   CC: Latanya is an 52 year old who presents for preventive health visit.   Reviewed her history.  She has hypertension. She is currently taking losartan 25 mg daily.  This medication is working well for her.  She reports no concerning adverse side effects.    She continues to work on weight loss.  She is doing Optivia. She is trying to exercise regularly and follow a healthy diet.  She continues to use cyclobenzaprine at bedtime.  This helps with muscle tightness and soreness and helps her sleep through the night.    She has a Mirena IUD that is due to be removed.  Would like this done today.  Does not need alternative contraception.  She was recently seen in the emergency department after a fall which resulted in a rib fracture.  She still has some discomfort but is doing better. Medications and allergies are reviewed and updated.  She reports no other changes in health since she was last seen in clinic.  She is due for mammogram.  Needs 6-month follow-up after her mammogram in February to follow-up on a cluster of cysts that were identified.  Review of systems is assessed.  She reports no concerns with chest pain or pressure.  No dyspnea with exertion.  No lower extremity edema.  No other questions today.    Patient has been advised of split billing requirements and indicates understanding: Yes  Healthy Habits:     Getting at least 3 servings of Calcium per day:  Yes    Bi-annual eye exam:  Yes    Dental care twice a year:  Yes    Sleep apnea or symptoms of sleep apnea:  None    Diet:  Carbohydrate counting    Frequency of exercise:  4-5 days/week    Duration of exercise:  30-45 minutes    Taking medications regularly:  Yes    Medication side effects:  None    PHQ-2 Total Score: 0    Additional concerns today:  Yes              Today's PHQ-2 Score:   PHQ-2 ( 1999 [employer]) 10/2/2022   Q1: Little interest or pleasure in doing things 0   Q2: Feeling down, depressed or hopeless 0   PHQ-2 Score 0   Q1:  Little interest or pleasure in doing things Not at all   Q2: Feeling down, depressed or hopeless Not at all   PHQ-2 Score 0       Abuse: Current or Past (Physical, Sexual or Emotional) - No  Do you feel safe in your environment? Yes        Social History     Tobacco Use     Smoking status: Former Smoker     Smokeless tobacco: Never Used   Substance Use Topics     Alcohol use: Yes     Alcohol/week: 12.8 standard drinks     Comment: Alcoholic Drinks/day: 14 drink total per week         Alcohol Use 10/2/2022   Prescreen: >3 drinks/day or >7 drinks/week? No       Reviewed orders with patient.  Reviewed health maintenance and updated orders accordingly - Yes      Breast Cancer Screening:    Breast CA Risk Assessment (FHS-7) 10/2/2022   Do you have a family history of breast, colon, or ovarian cancer? No / Unknown           Pertinent mammograms are reviewed under the imaging tab.    History of abnormal Pap smear: YES - other categories - see link Cervical Cytology Screening Guidelines     Reviewed and updated as needed this visit by clinical staff   Tobacco  Allergies  Meds                Reviewed and updated as needed this visit by Provider    Allergies  Meds                   Review of Systems   Constitutional: Negative for chills and fever.   HENT: Negative for congestion, ear pain, hearing loss and sore throat.    Eyes: Negative for pain and visual disturbance.   Respiratory: Negative for cough and shortness of breath.    Cardiovascular: Negative for chest pain, palpitations and peripheral edema.   Gastrointestinal: Negative for abdominal pain, constipation, diarrhea, heartburn, hematochezia and nausea.   Breasts:  Negative for tenderness, breast mass and discharge.   Genitourinary: Negative for dysuria, frequency, genital sores, hematuria, pelvic pain, urgency, vaginal bleeding and vaginal discharge.   Musculoskeletal: Negative for arthralgias, joint swelling and myalgias.   Skin: Negative for rash.  "  Neurological: Negative for dizziness, weakness, headaches and paresthesias.   Psychiatric/Behavioral: Negative for mood changes. The patient is not nervous/anxious.           OBJECTIVE:   /82 (BP Location: Right arm, Cuff Size: Adult Regular)   Pulse 74   Temp 98  F (36.7  C) (Oral)   Ht 1.657 m (5' 5.25\")   Wt 66.6 kg (146 lb 12.8 oz)   LMP 09/25/2022   SpO2 97%   BMI 24.24 kg/m    Physical Exam  GENERAL: healthy, alert and no distress  EYES: Eyes grossly normal to inspection, PERRL and conjunctivae and sclerae normal  HENT: normal cephalic/atraumatic and ear canals and TM's normal  RESP: lungs clear to auscultation - no rales, rhonchi or wheezes  BREAST: normal without masses, tenderness or nipple discharge and no palpable axillary masses or adenopathy  CV: regular rate and rhythm, normal S1 S2, no S3 or S4, no murmur, click or rub, no peripheral edema and peripheral pulses strong  ABDOMEN: soft, nontender, no hepatosplenomegaly, no masses and bowel sounds normal   (female): normal female external genitalia, normal urethral meatus , vaginal mucosa pink, moist, well rugated, normal cervix, adnexae, and uterus without masses. and IUD strings present  MS: no gross musculoskeletal defects noted, no edema  SKIN: no suspicious lesions or rashes  NEURO: Normal strength and tone, mentation intact and speech normal  PSYCH: mentation appears normal, affect normal/bright        ASSESSMENT/PLAN:   Haley was seen today for physical and polycystic runs in family.    Diagnoses and all orders for this visit:    Routine history and physical examination of adult  Pap smear performed today.  She is due for 6-month follow-up mammogram.  Encouraged her to schedule this.  Discussed colon cancer screening.  Order placed for Cologuard.  She will contact me if she decides to do the colonoscopy.  Discussed shingles vaccine.  Encouraged her to check with insurance.  She will get influenza vaccine at work.  Tetanus " "booster administered.  Declines COVID booster today.  She will return for fasting labs.  Encouraged healthy diet, regular exercise and adequate calcium and vitamin D intake.  Follow-up in 1 year for annual physical    Screen for colon cancer  -     COLOGUARD([employer])    Cervical cancer screening  -     Pap Screen with HPV - recommended age 30 - 65 years    Benign essential hypertension  -     losartan (COZAAR) 25 MG tablet; Take 1 tablet (25 mg) by mouth daily  -     Lipid panel reflex to direct LDL Fasting; Future  -     Comprehensive metabolic panel (BMP + Alb, Alk Phos, ALT, AST, Total. Bili, TP); Future  - Doing well on current medication.  Refill provided.  Continue with regular exercise and healthy diet    Encounter for IUD removal  -     REMOVE INTRAUTERINE DEVICE  - Discussed procedure for removal of IUD with patient.  Strings of IUD grasped with ring forceps and IUD was removed in its entirety.  It was placed in a specimen container for disposal.  She tolerated procedure well.    Other orders  -     REVIEW OF HEALTH MAINTENANCE PROTOCOL ORDERS  -     TDAP VACCINE (Adacel, Boostrix)            COUNSELING:  Reviewed preventive health counseling, as reflected in patient instructions    Estimated body mass index is 24.24 kg/m  as calculated from the following:    Height as of this encounter: 1.657 m (5' 5.25\").    Weight as of this encounter: 66.6 kg (146 lb 12.8 oz).        She reports that she has quit smoking. She has never used smokeless tobacco.      Counseling Resources:  ATP IV Guidelines  Pooled Cohorts Equation Calculator  Breast Cancer Risk Calculator  BRCA-Related Cancer Risk Assessment: FHS-7 Tool  FRAX Risk Assessment  ICSI Preventive Guidelines  Dietary Guidelines for Americans, 2010  USDA's MyPlate  ASA Prophylaxis  Lung CA Screening    Torie Tam MD  [facility]  "

## 2022-10-05 NOTE — PATIENT INSTRUCTIONS
Consider a ultrasound of your kidneys to screen for polycystic kidneys in the future    Schedule mammogram

## 2022-10-10 LAB
BKR LAB AP GYN ADEQUACY: NORMAL
BKR LAB AP GYN INTERPRETATION: NORMAL
BKR LAB AP HPV REFLEX: NORMAL
BKR LAB AP LMP: NORMAL
BKR LAB AP PREVIOUS ABNORMAL: NORMAL
PATH REPORT.COMMENTS IMP SPEC: NORMAL
PATH REPORT.COMMENTS IMP SPEC: NORMAL
PATH REPORT.RELEVANT HX SPEC: NORMAL

## 2022-10-12 LAB
HUMAN PAPILLOMA VIRUS 16 DNA: NEGATIVE
HUMAN PAPILLOMA VIRUS 18 DNA: NEGATIVE
HUMAN PAPILLOMA VIRUS FINAL DIAGNOSIS: NORMAL
HUMAN PAPILLOMA VIRUS OTHER HR: NEGATIVE

## 2022-11-21 ENCOUNTER — HEALTH MAINTENANCE LETTER (OUTPATIENT)
Age: 52
End: 2022-11-21

## 2023-01-20 ENCOUNTER — ANCILLARY ORDERS (OUTPATIENT)
Dept: MAMMOGRAPHY | Facility: CLINIC | Age: 53
End: 2023-01-20

## 2023-01-20 ENCOUNTER — ANCILLARY ORDERS (OUTPATIENT)
Dept: FAMILY MEDICINE | Facility: CLINIC | Age: 53
End: 2023-01-20

## 2023-01-20 DIAGNOSIS — Z12.31 VISIT FOR SCREENING MAMMOGRAM: ICD-10-CM

## 2023-01-21 DIAGNOSIS — M62.838 MUSCLE SPASM: ICD-10-CM

## 2023-01-22 RX ORDER — CYCLOBENZAPRINE HCL 10 MG
TABLET ORAL
Qty: 90 TABLET | Refills: 3 | Status: SHIPPED | OUTPATIENT
Start: 2023-01-22 | End: 2024-01-19

## 2023-01-25 ENCOUNTER — ANCILLARY ORDERS (OUTPATIENT)
Dept: FAMILY MEDICINE | Facility: CLINIC | Age: 53
End: 2023-01-25

## 2023-01-25 DIAGNOSIS — N63.20 MASS OF LEFT BREAST, UNSPECIFIED QUADRANT: ICD-10-CM

## 2023-02-03 ENCOUNTER — TRANSFERRED RECORDS (OUTPATIENT)
Dept: HEALTH INFORMATION MANAGEMENT | Facility: CLINIC | Age: 53
End: 2023-02-03

## 2023-02-20 ENCOUNTER — HOSPITAL ENCOUNTER (OUTPATIENT)
Dept: MAMMOGRAPHY | Facility: CLINIC | Age: 53
Discharge: HOME | End: 2023-02-20
Attending: FAMILY MEDICINE
Payer: COMMERCIAL

## 2023-02-20 DIAGNOSIS — N63.20 MASS OF LEFT BREAST, UNSPECIFIED QUADRANT: ICD-10-CM

## 2023-02-20 DIAGNOSIS — N63.20 LEFT BREAST MASS: ICD-10-CM

## 2023-02-20 PROCEDURE — 76642 ULTRASOUND BREAST LIMITED: CPT | Mod: LT

## 2023-02-20 PROCEDURE — 77062 BREAST TOMOSYNTHESIS BI: CPT

## 2023-05-05 ENCOUNTER — TRANSFERRED RECORDS (OUTPATIENT)
Dept: HEALTH INFORMATION MANAGEMENT | Facility: CLINIC | Age: 53
End: 2023-05-05
Payer: COMMERCIAL

## 2023-08-31 ENCOUNTER — TRANSFERRED RECORDS (OUTPATIENT)
Dept: HEALTH INFORMATION MANAGEMENT | Facility: CLINIC | Age: 53
End: 2023-08-31
Payer: COMMERCIAL

## 2023-11-26 ENCOUNTER — HEALTH MAINTENANCE LETTER (OUTPATIENT)
Age: 53
End: 2023-11-26

## 2023-11-28 ENCOUNTER — TRANSFERRED RECORDS (OUTPATIENT)
Dept: HEALTH INFORMATION MANAGEMENT | Facility: CLINIC | Age: 53
End: 2023-11-28
Payer: COMMERCIAL

## 2023-12-16 DIAGNOSIS — I10 BENIGN ESSENTIAL HYPERTENSION: ICD-10-CM

## 2023-12-18 RX ORDER — LOSARTAN POTASSIUM 25 MG/1
25 TABLET ORAL DAILY
Qty: 90 TABLET | Refills: 0 | Status: SHIPPED | OUTPATIENT
Start: 2023-12-18 | End: 2024-03-13

## 2024-01-19 DIAGNOSIS — M62.838 MUSCLE SPASM: ICD-10-CM

## 2024-01-19 RX ORDER — CYCLOBENZAPRINE HCL 10 MG
TABLET ORAL
Qty: 90 TABLET | Refills: 0 | Status: SHIPPED | OUTPATIENT
Start: 2024-01-19 | End: 2024-07-01

## 2024-03-13 DIAGNOSIS — I10 BENIGN ESSENTIAL HYPERTENSION: ICD-10-CM

## 2024-03-13 RX ORDER — LOSARTAN POTASSIUM 25 MG/1
25 TABLET ORAL DAILY
Qty: 90 TABLET | Refills: 0 | Status: SHIPPED | OUTPATIENT
Start: 2024-03-13 | End: 2024-06-14

## 2024-03-26 ENCOUNTER — TRANSFERRED RECORDS (OUTPATIENT)
Dept: HEALTH INFORMATION MANAGEMENT | Facility: CLINIC | Age: 54
End: 2024-03-26
Payer: COMMERCIAL

## 2024-04-19 DIAGNOSIS — M62.838 MUSCLE SPASM: ICD-10-CM

## 2024-04-19 RX ORDER — CYCLOBENZAPRINE HCL 10 MG
TABLET ORAL
Qty: 90 TABLET | Refills: 0 | OUTPATIENT
Start: 2024-04-19

## 2024-06-14 DIAGNOSIS — I10 BENIGN ESSENTIAL HYPERTENSION: ICD-10-CM

## 2024-06-14 RX ORDER — LOSARTAN POTASSIUM 25 MG/1
25 TABLET ORAL DAILY
Qty: 90 TABLET | Refills: 0 | OUTPATIENT
Start: 2024-06-14

## 2024-06-15 RX ORDER — LOSARTAN POTASSIUM 25 MG/1
25 TABLET ORAL DAILY
Qty: 30 TABLET | Refills: 0 | Status: SHIPPED | OUTPATIENT
Start: 2024-06-15 | End: 2024-07-16

## 2024-06-30 DIAGNOSIS — M62.838 MUSCLE SPASM: ICD-10-CM

## 2024-07-01 RX ORDER — CYCLOBENZAPRINE HCL 10 MG
10 TABLET ORAL 2 TIMES DAILY PRN
Qty: 30 TABLET | Refills: 0 | Status: SHIPPED | OUTPATIENT
Start: 2024-07-01 | End: 2024-08-13

## 2024-07-01 RX ORDER — CYCLOBENZAPRINE HCL 10 MG
TABLET ORAL
Qty: 21 TABLET | Refills: 0 | OUTPATIENT
Start: 2024-07-01

## 2024-07-01 NOTE — TELEPHONE ENCOUNTER
Called patient to inform her that she would need to establish care prior to getting a refill. She stated she is out of the medication, and will be going on vacation this week so can't set up an appointment soon enough. She has been scheduled for an appointment Tuesday 7/23 with Dr. Smith. As this is a long term medication the patient has taken for several years, she is wondering if she can get a bridge until the date of her appointment establishing care.    Guadalupe Chance RN  [facility]

## 2024-07-16 DIAGNOSIS — I10 BENIGN ESSENTIAL HYPERTENSION: ICD-10-CM

## 2024-07-16 RX ORDER — LOSARTAN POTASSIUM 25 MG/1
TABLET ORAL
Qty: 30 TABLET | Refills: 0 | Status: SHIPPED | OUTPATIENT
Start: 2024-07-16 | End: 2024-08-13

## 2024-08-12 SDOH — HEALTH STABILITY: PHYSICAL HEALTH: ON AVERAGE, HOW MANY DAYS PER WEEK DO YOU ENGAGE IN MODERATE TO STRENUOUS EXERCISE (LIKE A BRISK WALK)?: 4 DAYS

## 2024-08-12 SDOH — HEALTH STABILITY: PHYSICAL HEALTH: ON AVERAGE, HOW MANY MINUTES DO YOU ENGAGE IN EXERCISE AT THIS LEVEL?: 60 MIN

## 2024-08-12 ASSESSMENT — SOCIAL DETERMINANTS OF HEALTH (SDOH): HOW OFTEN DO YOU GET TOGETHER WITH FRIENDS OR RELATIVES?: TWICE A WEEK

## 2024-08-13 ENCOUNTER — OFFICE VISIT (OUTPATIENT)
Dept: FAMILY MEDICINE | Facility: CLINIC | Age: 54
End: 2024-08-13
Payer: COMMERCIAL

## 2024-08-13 ENCOUNTER — ORDERS ONLY (AUTO-RELEASED) (OUTPATIENT)
Dept: FAMILY MEDICINE | Facility: CLINIC | Age: 54
End: 2024-08-13

## 2024-08-13 VITALS
HEART RATE: 80 BPM | RESPIRATION RATE: 18 BRPM | DIASTOLIC BLOOD PRESSURE: 74 MMHG | SYSTOLIC BLOOD PRESSURE: 112 MMHG | WEIGHT: 152 LBS | OXYGEN SATURATION: 97 % | BODY MASS INDEX: 25.33 KG/M2 | TEMPERATURE: 98.6 F | HEIGHT: 65 IN

## 2024-08-13 DIAGNOSIS — Z13.220 SCREENING FOR HYPERLIPIDEMIA: ICD-10-CM

## 2024-08-13 DIAGNOSIS — Z13.228 SCREENING FOR METABOLIC DISORDER: ICD-10-CM

## 2024-08-13 DIAGNOSIS — Z12.11 SCREEN FOR COLON CANCER: ICD-10-CM

## 2024-08-13 DIAGNOSIS — I10 BENIGN ESSENTIAL HYPERTENSION: ICD-10-CM

## 2024-08-13 DIAGNOSIS — Z86.79 H/O VARICOSE VEINS: ICD-10-CM

## 2024-08-13 DIAGNOSIS — Z13.1 SCREENING FOR DIABETES MELLITUS: ICD-10-CM

## 2024-08-13 DIAGNOSIS — Z00.00 ROUTINE HISTORY AND PHYSICAL EXAMINATION OF ADULT: Primary | ICD-10-CM

## 2024-08-13 DIAGNOSIS — M62.838 MUSCLE SPASM: ICD-10-CM

## 2024-08-13 LAB
ALBUMIN SERPL BCG-MCNC: 4.6 G/DL (ref 3.5–5.2)
ALP SERPL-CCNC: 34 U/L (ref 40–150)
ALT SERPL W P-5'-P-CCNC: 26 U/L (ref 0–50)
ANION GAP SERPL CALCULATED.3IONS-SCNC: 9 MMOL/L (ref 7–15)
AST SERPL W P-5'-P-CCNC: 30 U/L (ref 0–45)
BILIRUB SERPL-MCNC: 0.5 MG/DL
BUN SERPL-MCNC: 18.5 MG/DL (ref 6–20)
CALCIUM SERPL-MCNC: 9.8 MG/DL (ref 8.8–10.4)
CHLORIDE SERPL-SCNC: 105 MMOL/L (ref 98–107)
CHOLEST SERPL-MCNC: 222 MG/DL
CREAT SERPL-MCNC: 0.8 MG/DL (ref 0.51–0.95)
EGFRCR SERPLBLD CKD-EPI 2021: 88 ML/MIN/1.73M2
ERYTHROCYTE [DISTWIDTH] IN BLOOD BY AUTOMATED COUNT: 12.7 % (ref 10–15)
FASTING STATUS PATIENT QL REPORTED: NO
FASTING STATUS PATIENT QL REPORTED: NO
GLUCOSE SERPL-MCNC: 80 MG/DL (ref 70–99)
HBA1C MFR BLD: 4.9 % (ref 0–5.6)
HCO3 SERPL-SCNC: 25 MMOL/L (ref 22–29)
HCT VFR BLD AUTO: 39.2 % (ref 35–47)
HDLC SERPL-MCNC: 84 MG/DL
HGB BLD-MCNC: 13.2 G/DL (ref 11.7–15.7)
LDLC SERPL CALC-MCNC: 123 MG/DL
MCH RBC QN AUTO: 31.9 PG (ref 26.5–33)
MCHC RBC AUTO-ENTMCNC: 33.7 G/DL (ref 31.5–36.5)
MCV RBC AUTO: 95 FL (ref 78–100)
NONHDLC SERPL-MCNC: 138 MG/DL
PLATELET # BLD AUTO: 242 10E3/UL (ref 150–450)
POTASSIUM SERPL-SCNC: 5.3 MMOL/L (ref 3.4–5.3)
PROT SERPL-MCNC: 7.2 G/DL (ref 6.4–8.3)
RBC # BLD AUTO: 4.14 10E6/UL (ref 3.8–5.2)
SODIUM SERPL-SCNC: 139 MMOL/L (ref 135–145)
TRIGL SERPL-MCNC: 73 MG/DL
WBC # BLD AUTO: 10.1 10E3/UL (ref 4–11)

## 2024-08-13 PROCEDURE — 83036 HEMOGLOBIN GLYCOSYLATED A1C: CPT

## 2024-08-13 PROCEDURE — 99396 PREV VISIT EST AGE 40-64: CPT

## 2024-08-13 PROCEDURE — 36415 COLL VENOUS BLD VENIPUNCTURE: CPT

## 2024-08-13 PROCEDURE — 99214 OFFICE O/P EST MOD 30 MIN: CPT | Mod: 25

## 2024-08-13 PROCEDURE — 80053 COMPREHEN METABOLIC PANEL: CPT

## 2024-08-13 PROCEDURE — 80061 LIPID PANEL: CPT

## 2024-08-13 PROCEDURE — 85027 COMPLETE CBC AUTOMATED: CPT

## 2024-08-13 RX ORDER — LOSARTAN POTASSIUM 25 MG/1
TABLET ORAL
Qty: 30 TABLET | Refills: 0 | OUTPATIENT
Start: 2024-08-13

## 2024-08-13 RX ORDER — LOSARTAN POTASSIUM 25 MG/1
25 TABLET ORAL DAILY
Qty: 90 TABLET | Refills: 3 | Status: SHIPPED | OUTPATIENT
Start: 2024-08-13

## 2024-08-13 RX ORDER — CYCLOBENZAPRINE HCL 10 MG
10 TABLET ORAL
Qty: 90 TABLET | Refills: 3 | Status: SHIPPED | OUTPATIENT
Start: 2024-08-13

## 2024-08-13 ASSESSMENT — PAIN SCALES - GENERAL: PAINLEVEL: NO PAIN (0)

## 2024-08-13 NOTE — PROGRESS NOTES
"Preventive Care Visit  [facility] SHAYLA Cadena CNP, Family Medicine  Aug 13, 2024      Assessment & Plan     Routine history and physical examination of adult  Screening labs today per maintenance, age, risk assessment, and to promote patient wellbeing.  Mammogram completed 2023, negative for malignancy, due 2/2025.  Pap exam with HPV testing completed 2022, negative for malignancy, due 10/2027.  - REVIEW OF HEALTH MAINTENANCE PROTOCOL ORDERS  - PRIMARY CARE FOLLOW-UP SCHEDULING; Future  - CBC with platelets    Screen for colon cancer  No prior colon cancer screening.  Discussed Cologuard and colonoscopy.  Patient would like to move forward with Cologuard.  No family history of colon cancer.  - COLOGUARD([employer]); Future    Screening for hyperlipidemia  2019 .  Managed with lifestyle provocations.  - Lipid panel reflex to direct LDL Non-fasting    Screening for diabetes mellitus  2021 glucose 81.  No prior A1c.  - Hemoglobin A1c    Benign essential hypertension  Blood pressure in clinic 112/74.  Managed with losartan 25 mg daily.  Needs refill.  - losartan (COZAAR) 25 MG tablet; Take 1 tablet (25 mg) by mouth daily  - Comprehensive metabolic panel    Muscle spasm  Has been evaluated rheumatology who ruled out RLS.  Has been taking cyclobenzaprine 10 mg at night for lower leg muscle spasms.  - cyclobenzaprine (FLEXERIL) 10 MG tablet; Take 1 tablet (10 mg) by mouth nightly as needed for muscle spasms    H/O varicose veins  Varicose vein of left anterior knee.  Discussed sclerotherapy as possible treatment option.  Offered referral to vascular surgery which patient declined at this time.     BMI  Estimated body mass index is 25.54 kg/m  as calculated from the following:    Height as of this encounter: 1.643 m (5' 4.69\").    Weight as of this encounter: 68.9 kg (152 lb).     Counseling  Appropriate preventive services were addressed with this patient via screening, " questionnaire, or discussion as appropriate for fall prevention, nutrition, physical activity, Tobacco-use cessation, weight loss and cognition.  Checklist reviewing preventive services available has been given to the patient.  Reviewed patient's diet, addressing concerns and/or questions.   She is at risk for psychosocial distress and has been provided with information to reduce risk.   The patient reports drinking more than 3 alcoholic drinks per day and/or more than 7 drhnks per week. The patient was counseled and given information about possible harmful effects of excessive alcohol intake.    Ramón Pelaez is a 54 year old, presenting for the following:  Physical        8/13/2024     9:08 AM   Additional Questions   Roomed by Yi COLON CMA      Health Care Directive  Patient does not have a Health Care Directive or Living Will:    HPI    Patient is a 54-year-old female who presents for annual physical exam.  Medical history includes lower muscle myalgias, cervical radiculopathy, HTN.  Previous provider was Dr. Tam.    Hypertension managed with losartan 25 mg daily.  Blood pressures appear to be normotensive since 2020.  Denies any side effects of medication.  Has lower leg muscle spasms/myalgias.  Has been evaluated by rheumatology who ruled out RLS.  Iron studies have been within normal limits.  Currently takes cyclobenzaprine 10 mg at bedtime for symptom management.  Understands sedative side effects of medication.  Varicose vein of left anterior knee.  Has been present for the past several years.  Usually asymptomatic but with physical activity can become slightly painful.  No history of rupture of varicose vein.  She has been evaluated by cosmetic vein clinic who provided options that patient did not want to proceed with.          8/12/2024   General Health   How would you rate your overall physical health? Good   Feel stress (tense, anxious, or unable to sleep) Only a little      (!) STRESS  CONCERN      8/12/2024   Nutrition   Three or more servings of calcium each day? Yes   Diet: Low fat/cholesterol   How many servings of fruit and vegetables per day? (!) 2-3   How many sweetened beverages each day? 0-1          8/12/2024   Exercise   Days per week of moderate/strenous exercise 4 days   Average minutes spent exercising at this level 60 min          8/12/2024   Social Factors   Frequency of gathering with friends or relatives Twice a week   Worry food won't last until get money to buy more No   Food not last or not have enough money for food? No   Do you have housing? (Housing is defined as stable permanent housing and does not include staying ouside in a car, in a tent, in an abandoned building, in an overnight shelter, or couch-surfing.) Yes   Are you worried about losing your housing? No   Lack of transportation? No   Unable to get utilities (heat,electricity)? No          8/12/2024   Fall Risk   Fallen 2 or more times in the past year? No   Trouble with walking or balance? No             8/12/2024   Dental   Dentist two times every year? Yes          8/12/2024   TB Screening   Were you born outside of the [place]? No      Today's PHQ-2 Score:       8/12/2024    12:58 PM   PHQ-2 ( 1999 [employer])   Q1: Little interest or pleasure in doing things 0   Q2: Feeling down, depressed or hopeless 0   PHQ-2 Score 0   Q1: Little interest or pleasure in doing things Not at all   Q2: Feeling down, depressed or hopeless Not at all   PHQ-2 Score 0         8/12/2024   Substance Use   Alcohol more than 3/day or more than 7/wk Yes   How often do you have a drink containing alcohol 2 to 3 times a week   How many alcohol drinks on typical day 3 or 4   How often do you have 5+ drinks at one occasion Less than monthly   Audit 2/3 Score 2   How often not able to stop drinking once started Never   How often failed to do what normally expected Never   How often needed first drink in am after a heavy drinking session Never   How  often feeling of guilt or remorse after drinking Never   How often unable to remember what happened the night before Never   Have you or someone else been injured because of your drinking No   Has anyone been concerned or suggested you cut down on drinking No   TOTAL SCORE - AUDIT 5   Do you use any other substances recreationally? (!) CANNABIS PRODUCTS      Social History     Tobacco Use    Smoking status: Former    Smokeless tobacco: Never   Vaping Use    Vaping status: Never Used   Substance Use Topics    Alcohol use: Yes     Alcohol/week: 12.8 standard drinks of alcohol     Comment: Alcoholic Drinks/day: 14 drink total per week    Drug use: No           2023   LAST FHS-7 RESULTS   1st degree relative breast or ovarian cancer No   Any relative bilateral breast cancer No   Any male have breast cancer No   Any ONE woman have BOTH breast AND ovarian cancer No   Any woman with breast cancer before 50yrs No   2 or more relatives with breast AND/OR ovarian cancer No   2 or more relatives with breast AND/OR bowel cancer No         Mammogram Screening - Mammogram every 1-2 years updated in Health Maintenance based on mutual decision making        2024   STI Screening   New sexual partner(s) since last STI/HIV test? No      History of abnormal Pap smear: No - age 30-64 HPV with reflex Pap every 5 years recommended        Latest Ref Rng & Units 10/5/2022     4:24 PM   PAP / HPV   PAP  Negative for Intraepithelial Lesion or Malignancy (NILM)    HPV 16 DNA Negative Negative    HPV 18 DNA Negative Negative    Other HR HPV Negative Negative      ASCVD Risk   The ASCVD Risk score (Carine OREILLY, et al., 2019) failed to calculate for the following reasons:    Cannot find a previous HDL lab    Cannot find a previous total cholesterol lab    Fracture Risk Assessment Tool  Link to Frax Calculator  Use the information below to complete the Frax calculator  : 1970  Sex: female  Weight (kg): 68.9 kg (actual  "weight)  Height (cm): 164.3 cm  Previous Fragility Fracture:  No  History of parent with fractured hip:  No  Current Smoking:  No  Patient has been on glucocorticoids for more than 3 months (5mg/day or more): No  Rheumatoid Arthritis on Problem List:  No  Secondary Osteoporosis on Problem List:  No  Consumes 3 or more units of alcohol per day: No  Femoral Neck BMD (g/cm2)           Reviewed and updated as needed this visit by Provider    No past medical history on file.  No past surgical history on file.  BP Readings from Last 3 Encounters:   08/13/24 112/74   10/05/22 124/82   09/02/22 133/89    Wt Readings from Last 3 Encounters:   08/13/24 68.9 kg (152 lb)   10/05/22 66.6 kg (146 lb 12.8 oz)   09/24/21 77.5 kg (170 lb 12.8 oz)         Current Outpatient Medications   Medication Sig Dispense Refill    cyclobenzaprine (FLEXERIL) 10 MG tablet Take 1 tablet (10 mg) by mouth nightly as needed for muscle spasms 90 tablet 3    losartan (COZAAR) 25 MG tablet Take 1 tablet (25 mg) by mouth daily 90 tablet 3    MULTIVITAMIN ORAL [MULTIVITAMIN ORAL] Take by mouth daily.       Review of Systems  Review of systems negative otherwise known HPI.     Objective    Exam  /74 (BP Location: Right arm, Patient Position: Sitting, Cuff Size: Adult Regular)   Pulse 80   Temp 98.6  F (37  C) (Temporal)   Resp 18   Ht 1.643 m (5' 4.69\")   Wt 68.9 kg (152 lb)   LMP 07/09/2024 (Approximate)   SpO2 97%   BMI 25.54 kg/m     Estimated body mass index is 25.54 kg/m  as calculated from the following:    Height as of this encounter: 1.643 m (5' 4.69\").    Weight as of this encounter: 68.9 kg (152 lb).    Physical Exam  General: Alert, oriented, no acute distress.     Eyes: Normal external eye, conjunctiva, and lids. ROSE.  Ears: External ear nontender. Normal landmark and color.   Oropharynx: Dentition intact. Oral and posterior pharynx pink and moist.      Neck: Supple, no masses or nodes. No adenopathy.     Respiratory: Lungs " clear, unlabored. No rales, rhonchi, or wheezes.     Cardiovascular: Regular rate and rhythm. No murmurs. No peripheral edema.   Breasts: Declined.  Gastrointestinal: Normoactive bowel sounds. Soft, nontender, no organomegaly.      Musculoskeletal: No joint tenderness, deformity, or swelling.      Skin: No suspicious lesions, rashes, or abnormalities.     Neurologic: No gross motor or sensory deficit. Mentation intact and speech normal.      Psychiatric: Appropriate affect.   Genitourinary: Pap exam not due.    Signed Electronically by: SHAYLA Fuentes CNP

## 2024-09-17 DIAGNOSIS — R19.5 POSITIVE COLORECTAL CANCER SCREENING USING COLOGUARD TEST: Primary | ICD-10-CM

## 2024-09-17 LAB — NONINV COLON CA DNA+OCC BLD SCRN STL QL: POSITIVE

## 2024-09-18 NOTE — RESULT ENCOUNTER NOTE
Unfortunately your Cologuard has come back positive.  We need to follow a positive result with a diagnostic colonoscopy.  Will place order.  Imaging should call you to schedule procedure.

## 2024-09-25 ENCOUNTER — TELEPHONE (OUTPATIENT)
Dept: GASTROENTEROLOGY | Facility: CLINIC | Age: 54
End: 2024-09-25
Payer: COMMERCIAL

## 2024-09-25 NOTE — TELEPHONE ENCOUNTER
"Endoscopy Scheduling Screen    Have you had any respiratory illness or flu-like symptoms in the last 10 days?  No    What is your communication preference for Instructions and/or Bowel Prep?   MyChart    What insurance is in the chart?  Other:  [employer]    Ordering/Referring Provider: BETTY BURT    (If ordering provider performs procedure, schedule with ordering provider unless otherwise instructed. )    BMI: Estimated body mass index is 25.54 kg/m  as calculated from the following:    Height as of 8/13/24: 1.643 m (5' 4.69\").    Weight as of 8/13/24: 68.9 kg (152 lb).     Sedation Ordered  moderate sedation.   If patient BMI > 50 do not schedule in [facility].    If patient BMI > 45 do not schedule at [facility].    Are you taking methadone or Suboxone?  NO, No RN review required.    Have you been diagnosed and are being treated for severe PTSD or severe anxiety?  NO, No RN review required.    Are you taking any prescription medications for pain 3 or more times per week?   NO, No RN review required.    Do you have a history of malignant hyperthermia?  No    (Females) Are you currently pregnant?   No     Have you been diagnosed or told you have pulmonary hypertension?   No    Do you have an LVAD?  No    Have you been told you have moderate to severe sleep apnea?  No.    Have you been told you have COPD, asthma, or any other lung disease?  No    Do you have any heart conditions?  No     Have you ever had or are you waiting for an organ transplant?  No. Continue scheduling, no site restrictions.    Have you had a stroke or transient ischemic attack (TIA aka \"mini stroke\" in the last 6 months?   No    Have you been diagnosed with or been told you have cirrhosis of the liver?   No.    Are you currently on dialysis?   No    Do you need assistance transferring?   No    BMI: Estimated body mass index is 25.54 kg/m  as calculated from the following:    Height as of 8/13/24: 1.643 m (5' 4.69\").    Weight as of 8/13/24: 68.9 kg (152 lb). "     Is patients BMI > 40 and scheduling location [facility]?  No    Do you take an injectable or oral medication for weight loss or diabetes (excluding insulin)?  No    Do you take the medication Naltrexone?  No    Do you take blood thinners?  No       Prep   Are you currently on dialysis or do you have chronic kidney disease?  No    Do you have a diagnosis of diabetes?  No    Do you have a diagnosis of cystic fibrosis (CF)?  No    On a regular basis do you go 3 -5 days between bowel movements?  No    BMI > 40?  No    Preferred Pharmacy:    [address] [address]  Phone: [phone] Fax: [phone]        Final Scheduling Details     Pt would like to go to [facility].

## 2024-10-03 ENCOUNTER — TRANSFERRED RECORDS (OUTPATIENT)
Dept: HEALTH INFORMATION MANAGEMENT | Facility: CLINIC | Age: 54
End: 2024-10-03
Payer: COMMERCIAL

## 2024-11-08 ENCOUNTER — TRANSFERRED RECORDS (OUTPATIENT)
Dept: MULTI SPECIALTY CLINIC | Facility: CLINIC | Age: 54
End: 2024-11-08

## 2025-03-25 ENCOUNTER — OFFICE VISIT (OUTPATIENT)
Dept: FAMILY MEDICINE | Facility: CLINIC | Age: 55
End: 2025-03-25
Payer: COMMERCIAL

## 2025-03-25 VITALS
OXYGEN SATURATION: 100 % | SYSTOLIC BLOOD PRESSURE: 129 MMHG | WEIGHT: 156 LBS | BODY MASS INDEX: 26.47 KG/M2 | HEART RATE: 85 BPM | TEMPERATURE: 98.5 F | DIASTOLIC BLOOD PRESSURE: 72 MMHG | RESPIRATION RATE: 16 BRPM

## 2025-03-25 DIAGNOSIS — J01.00 ACUTE NON-RECURRENT MAXILLARY SINUSITIS: Primary | ICD-10-CM

## 2025-03-25 PROCEDURE — 99213 OFFICE O/P EST LOW 20 MIN: CPT | Performed by: FAMILY MEDICINE

## 2025-03-25 PROCEDURE — 3074F SYST BP LT 130 MM HG: CPT | Performed by: FAMILY MEDICINE

## 2025-03-25 PROCEDURE — 3078F DIAST BP <80 MM HG: CPT | Performed by: FAMILY MEDICINE

## 2025-03-25 RX ORDER — AZITHROMYCIN 250 MG/1
TABLET, FILM COATED ORAL
Qty: 6 TABLET | Refills: 0 | Status: SHIPPED | OUTPATIENT
Start: 2025-03-25 | End: 2025-03-30

## 2025-03-25 ASSESSMENT — ENCOUNTER SYMPTOMS
PALPITATIONS: 0
DYSURIA: 0
ARTHRALGIAS: 0
ABDOMINAL PAIN: 0
SHORTNESS OF BREATH: 0
RHINORRHEA: 1
CHILLS: 0
EYE PAIN: 0
COLOR CHANGE: 0
FEVER: 0
COUGH: 1
VOMITING: 0
SORE THROAT: 1
SINUS PRESSURE: 1
SEIZURES: 0
HEMATURIA: 0
BACK PAIN: 0

## 2025-03-25 NOTE — PROGRESS NOTES
Assessment & Plan  Acute non-recurrent maxillary sinusitis  Sinusitis as noted on exam-patient has used Z-Dmitriy in the past successfully and says it is really the only antibiotic that works for her sinusitis when she gets it.  Prescription sent to her pharmacy and she is asked her return to clinic if not improving as expected.  Orders:    azithromycin (ZITHROMAX) 250 MG tablet; Take 2 tablets (500 mg) by mouth daily for 1 day, THEN 1 tablet (250 mg) daily for 4 days.          Subjective   Latanya is a 54 year old, presenting for the following health issues:  Otalgia and Nasal Congestion (LEFT EAR PAIN/)      3/25/2025    12:56 PM   Additional Questions   Roomed by AS   Accompanied by SELF         3/25/2025    12:56 PM   Patient Reported Additional Medications   Patient reports taking the following new medications NO     History of Present Illness       Reason for visit:  Sore thtoat, ear ache, cough and congestion  Symptom onset:  1-2 weeks ago  Symptom intensity:  Severe  Symptom progression:  Worsening  Had these symptoms before:  Yes  Has tried/received treatment for these symptoms:  Yes  Previous treatment was successful:  Yes  Prior treatment description:  Antibiotic  What makes it worse:  No  What makes it better:  Medication   She is taking medications regularly.      Review of Systems   Constitutional:  Negative for chills and fever.   HENT:  Positive for congestion, postnasal drip, rhinorrhea, sinus pressure and sore throat. Negative for ear pain.    Eyes:  Negative for pain and visual disturbance.   Respiratory:  Positive for cough. Negative for shortness of breath.    Cardiovascular:  Negative for chest pain and palpitations.   Gastrointestinal:  Negative for abdominal pain and vomiting.   Genitourinary:  Negative for dysuria and hematuria.   Musculoskeletal:  Negative for arthralgias and back pain.   Skin:  Negative for color change and rash.   Neurological:  Negative for seizures and syncope.   All other  systems reviewed and are negative.         Objective    /72 (BP Location: Left arm, Patient Position: Left side, Cuff Size: Adult Large)   Pulse 85   Temp 98.5  F (36.9  C) (Oral)   Resp 16   Wt 70.8 kg (156 lb)   LMP 03/24/2025   SpO2 100%   BMI 26.47 kg/m    Body mass index is 26.47 kg/m .  Physical Exam  Constitutional:       Appearance: Normal appearance.   HENT:      Head: Normocephalic and atraumatic.      Right Ear: Tympanic membrane, ear canal and external ear normal.      Left Ear: Tympanic membrane, ear canal and external ear normal.      Nose: Congestion and rhinorrhea present.      Right Turbinates: Enlarged and swollen.      Left Turbinates: Enlarged.      Right Sinus: Maxillary sinus tenderness and frontal sinus tenderness present.      Left Sinus: Maxillary sinus tenderness and frontal sinus tenderness present.      Mouth/Throat:      Pharynx: Oropharyngeal exudate and posterior oropharyngeal erythema present.   Eyes:      Extraocular Movements: Extraocular movements intact.      Conjunctiva/sclera: Conjunctivae normal.      Pupils: Pupils are equal, round, and reactive to light.   Cardiovascular:      Rate and Rhythm: Normal rate and regular rhythm.      Heart sounds: Normal heart sounds.   Pulmonary:      Effort: Pulmonary effort is normal.      Breath sounds: Normal breath sounds.   Abdominal:      General: Bowel sounds are normal.      Palpations: Abdomen is soft.   Musculoskeletal:         General: Normal range of motion.      Cervical back: Normal range of motion and neck supple.   Neurological:      General: No focal deficit present.      Mental Status: She is alert and oriented to person, place, and time.                    Signed Electronically by: Karena Chavez MD

## 2025-05-10 ENCOUNTER — HEALTH MAINTENANCE LETTER (OUTPATIENT)
Age: 55
End: 2025-05-10